# Patient Record
Sex: MALE | Race: OTHER | HISPANIC OR LATINO | Employment: FULL TIME | URBAN - METROPOLITAN AREA
[De-identification: names, ages, dates, MRNs, and addresses within clinical notes are randomized per-mention and may not be internally consistent; named-entity substitution may affect disease eponyms.]

---

## 2020-12-21 ENCOUNTER — NURSE TRIAGE (OUTPATIENT)
Dept: OTHER | Facility: OTHER | Age: 50
End: 2020-12-21

## 2020-12-22 ENCOUNTER — NURSE TRIAGE (OUTPATIENT)
Dept: OTHER | Facility: OTHER | Age: 50
End: 2020-12-22

## 2020-12-22 DIAGNOSIS — Z20.828 SARS-ASSOCIATED CORONAVIRUS EXPOSURE: Primary | ICD-10-CM

## 2020-12-26 DIAGNOSIS — Z20.828 SARS-ASSOCIATED CORONAVIRUS EXPOSURE: ICD-10-CM

## 2020-12-26 PROCEDURE — U0003 INFECTIOUS AGENT DETECTION BY NUCLEIC ACID (DNA OR RNA); SEVERE ACUTE RESPIRATORY SYNDROME CORONAVIRUS 2 (SARS-COV-2) (CORONAVIRUS DISEASE [COVID-19]), AMPLIFIED PROBE TECHNIQUE, MAKING USE OF HIGH THROUGHPUT TECHNOLOGIES AS DESCRIBED BY CMS-2020-01-R: HCPCS | Performed by: FAMILY MEDICINE

## 2020-12-28 LAB — SARS-COV-2 RNA SPEC QL NAA+PROBE: DETECTED

## 2020-12-30 ENCOUNTER — TELEPHONE (OUTPATIENT)
Dept: CARDIOLOGY CLINIC | Facility: CLINIC | Age: 50
End: 2020-12-30

## 2020-12-31 ENCOUNTER — TELEPHONE (OUTPATIENT)
Dept: OTHER | Facility: OTHER | Age: 50
End: 2020-12-31

## 2020-12-31 NOTE — TELEPHONE ENCOUNTER
The patient was called for notification of a test result for COVID-19  The patient did not answer the phone and a voicemail was left requesting a call back to [phone], Option 7

## 2021-01-03 ENCOUNTER — TELEPHONE (OUTPATIENT)
Dept: OTHER | Facility: OTHER | Age: 51
End: 2021-01-03

## 2021-01-03 NOTE — TELEPHONE ENCOUNTER
The patient was called for notification of a test result for COVID-19  The patient answered and was provided with the following information:    Your test for the novel coronavirus, also known as COVID-19, was positive  The sample showed that the virus was present  We are going to go through some general information to keep you safe at home and schedule a virtual visit for you to be evaluated by your provider  If your symptoms are generally mild and stable, you are safe to isolate yourself at home  If you develop difficulty breathing before your scheduled visit with your provider, you should contact the office immediately or go to the nearest ED for evaluation  Treatment of coronavirus does not require an antibiotic  The most important thing you can do is to remain home except to receive medical care  Do not go to work, school, or public areas  Wash your hands often with soap and water for at least 20 seconds  Alternatively, you may use hand  with at least 60% alcohol content  Cover your coughs and sneezes and use a facemask when around others if possible  Clean all high-touch surfaces every day such as doorknobs and cellphones  Positive COVID-19 test results are reportable to the [place] Department of Health  You may receive a call from trained public health staff to conduct an interview  It is important to answer their call  They will ask you to verify who you are  During the call they will ask you about what symptoms you have, what you did before you got sick, and who you were close to while sick  The health department does this to make sure everyone stays healthy and to reduce the spread of the virus  If you would like to verify if the caller does in fact work in contact tracing, call the [address] [place] [address] at [employer] ([phone])   For additional information, please visit the [facility] website: www health [place] gov     If you have any additional questions, we can schedule a virtual visit for you with a provider or call the [facility] [phone], option [phone], for care advice    For additional information, please visit the Coronavirus FAQ on the [facility] home page ([facility]  org)

## 2022-03-11 ENCOUNTER — HOSPITAL ENCOUNTER (EMERGENCY)
Facility: HOSPITAL | Age: 52
Discharge: HOME | End: 2022-03-11
Payer: COMMERCIAL

## 2022-03-11 ENCOUNTER — APPOINTMENT (EMERGENCY)
Dept: CT IMAGING | Facility: HOSPITAL | Age: 52
End: 2022-03-11
Payer: COMMERCIAL

## 2022-03-11 VITALS
RESPIRATION RATE: 18 BRPM | SYSTOLIC BLOOD PRESSURE: 127 MMHG | WEIGHT: 195.33 LBS | TEMPERATURE: 97.3 F | HEART RATE: 97 BPM | BODY MASS INDEX: 25.89 KG/M2 | OXYGEN SATURATION: 97 % | DIASTOLIC BLOOD PRESSURE: 78 MMHG | HEIGHT: 73 IN

## 2022-03-11 DIAGNOSIS — N39.0 UTI (URINARY TRACT INFECTION): Primary | ICD-10-CM

## 2022-03-11 LAB
ALBUMIN SERPL BCP-MCNC: 3.2 G/DL (ref 3.5–5)
ALP SERPL-CCNC: 61 U/L (ref 46–116)
ALT SERPL W P-5'-P-CCNC: 17 U/L (ref 12–78)
ANION GAP SERPL CALCULATED.3IONS-SCNC: 10 MMOL/L (ref 4–13)
AST SERPL W P-5'-P-CCNC: 14 U/L (ref 5–45)
BACTERIA UR QL AUTO: ABNORMAL /HPF
BASOPHILS # BLD AUTO: 0.04 THOUSANDS/ΜL (ref 0–0.1)
BASOPHILS NFR BLD AUTO: 0 % (ref 0–1)
BILIRUB SERPL-MCNC: 1.06 MG/DL (ref 0.2–1)
BILIRUB UR QL STRIP: ABNORMAL
BUN SERPL-MCNC: 15 MG/DL (ref 5–25)
CALCIUM ALBUM COR SERPL-MCNC: 8.8 MG/DL (ref 8.3–10.1)
CALCIUM SERPL-MCNC: 8.2 MG/DL (ref 8.3–10.1)
CHLORIDE SERPL-SCNC: 103 MMOL/L (ref 100–108)
CLARITY UR: ABNORMAL
CO2 SERPL-SCNC: 23 MMOL/L (ref 21–32)
COLOR UR: YELLOW
CREAT SERPL-MCNC: 1.14 MG/DL (ref 0.6–1.3)
EOSINOPHIL # BLD AUTO: 0.1 THOUSAND/ΜL (ref 0–0.61)
EOSINOPHIL NFR BLD AUTO: 1 % (ref 0–6)
ERYTHROCYTE [DISTWIDTH] IN BLOOD BY AUTOMATED COUNT: 12.7 % (ref 11.6–15.1)
GFR SERPL CREATININE-BSD FRML MDRD: 73 ML/MIN/1.73SQ M
GLUCOSE SERPL-MCNC: 155 MG/DL (ref 65–140)
GLUCOSE UR STRIP-MCNC: NEGATIVE MG/DL
HCT VFR BLD AUTO: 39.1 % (ref 36.5–49.3)
HGB BLD-MCNC: 13.2 G/DL (ref 12–17)
HGB UR QL STRIP.AUTO: ABNORMAL
IMM GRANULOCYTES # BLD AUTO: 0.23 THOUSAND/UL (ref 0–0.2)
IMM GRANULOCYTES NFR BLD AUTO: 1 % (ref 0–2)
KETONES UR STRIP-MCNC: ABNORMAL MG/DL
LEUKOCYTE ESTERASE UR QL STRIP: ABNORMAL
LYMPHOCYTES # BLD AUTO: 2.21 THOUSANDS/ΜL (ref 0.6–4.47)
LYMPHOCYTES NFR BLD AUTO: 12 % (ref 14–44)
MCH RBC QN AUTO: 29.1 PG (ref 26.8–34.3)
MCHC RBC AUTO-ENTMCNC: 33.8 G/DL (ref 31.4–37.4)
MCV RBC AUTO: 86 FL (ref 82–98)
MONOCYTES # BLD AUTO: 1.49 THOUSAND/ΜL (ref 0.17–1.22)
MONOCYTES NFR BLD AUTO: 8 % (ref 4–12)
NEUTROPHILS # BLD AUTO: 14.93 THOUSANDS/ΜL (ref 1.85–7.62)
NEUTS SEG NFR BLD AUTO: 78 % (ref 43–75)
NITRITE UR QL STRIP: POSITIVE
NON-SQ EPI CELLS URNS QL MICRO: ABNORMAL /HPF
NRBC BLD AUTO-RTO: 0 /100 WBCS
PH UR STRIP.AUTO: 6 [PH]
PLATELET # BLD AUTO: 159 THOUSANDS/UL (ref 149–390)
PMV BLD AUTO: 8.8 FL (ref 8.9–12.7)
POTASSIUM SERPL-SCNC: 3.2 MMOL/L (ref 3.5–5.3)
PROT SERPL-MCNC: 7.6 G/DL (ref 6.4–8.2)
PROT UR STRIP-MCNC: ABNORMAL MG/DL
RBC # BLD AUTO: 4.53 MILLION/UL (ref 3.88–5.62)
RBC #/AREA URNS AUTO: ABNORMAL /HPF
SODIUM SERPL-SCNC: 136 MMOL/L (ref 136–145)
SP GR UR STRIP.AUTO: >=1.03 (ref 1–1.03)
UROBILINOGEN UR QL STRIP.AUTO: 2 E.U./DL
WBC # BLD AUTO: 19 THOUSAND/UL (ref 4.31–10.16)
WBC #/AREA URNS AUTO: ABNORMAL /HPF

## 2022-03-11 PROCEDURE — 80053 COMPREHEN METABOLIC PANEL: CPT | Performed by: PHYSICIAN ASSISTANT

## 2022-03-11 PROCEDURE — 96365 THER/PROPH/DIAG IV INF INIT: CPT

## 2022-03-11 PROCEDURE — 87086 URINE CULTURE/COLONY COUNT: CPT | Performed by: PHYSICIAN ASSISTANT

## 2022-03-11 PROCEDURE — 96361 HYDRATE IV INFUSION ADD-ON: CPT

## 2022-03-11 PROCEDURE — 87077 CULTURE AEROBIC IDENTIFY: CPT | Performed by: PHYSICIAN ASSISTANT

## 2022-03-11 PROCEDURE — 85025 COMPLETE CBC W/AUTO DIFF WBC: CPT | Performed by: PHYSICIAN ASSISTANT

## 2022-03-11 PROCEDURE — 99284 EMERGENCY DEPT VISIT MOD MDM: CPT

## 2022-03-11 PROCEDURE — 81001 URINALYSIS AUTO W/SCOPE: CPT | Performed by: PHYSICIAN ASSISTANT

## 2022-03-11 PROCEDURE — 74177 CT ABD & PELVIS W/CONTRAST: CPT

## 2022-03-11 PROCEDURE — 87186 SC STD MICRODIL/AGAR DIL: CPT | Performed by: PHYSICIAN ASSISTANT

## 2022-03-11 PROCEDURE — 99284 EMERGENCY DEPT VISIT MOD MDM: CPT | Performed by: PHYSICIAN ASSISTANT

## 2022-03-11 PROCEDURE — 36415 COLL VENOUS BLD VENIPUNCTURE: CPT | Performed by: PHYSICIAN ASSISTANT

## 2022-03-11 PROCEDURE — G1004 CDSM NDSC: HCPCS

## 2022-03-11 RX ORDER — DOXYCYCLINE 100 MG/1
100 TABLET ORAL 2 TIMES DAILY
Qty: 14 TABLET | Refills: 0 | Status: SHIPPED | OUTPATIENT
Start: 2022-03-11 | End: 2022-03-18

## 2022-03-11 RX ORDER — CEFTRIAXONE 1 G/50ML
1000 INJECTION, SOLUTION INTRAVENOUS ONCE
Status: COMPLETED | OUTPATIENT
Start: 2022-03-11 | End: 2022-03-11

## 2022-03-11 RX ORDER — CEPHALEXIN 500 MG/1
500 CAPSULE ORAL EVERY 6 HOURS SCHEDULED
Qty: 28 CAPSULE | Refills: 0 | Status: SHIPPED | OUTPATIENT
Start: 2022-03-11 | End: 2022-03-18

## 2022-03-11 RX ADMIN — IOHEXOL 100 ML: 350 INJECTION, SOLUTION INTRAVENOUS at 19:21

## 2022-03-11 RX ADMIN — CEFTRIAXONE 1000 MG: 1 INJECTION, SOLUTION INTRAVENOUS at 19:49

## 2022-03-11 RX ADMIN — SODIUM CHLORIDE 1000 ML: 0.9 INJECTION, SOLUTION INTRAVENOUS at 18:43

## 2022-03-11 NOTE — ED PROVIDER NOTES
History  Chief Complaint   Patient presents with    Blood in Urine     pt c/o fevers, chills, difficulty urinating and burning on urination starting on thursday  pt also states he has blood in urine   Fever - 9 weeks to 74 years     Patient presents to the emergency department today for evaluation some blood in the urine with fevers burning with urination mild low back pain  Symptoms present for about 3-4 days  Denies any penile discharge  No chest pain shortness of breath  No vomiting  No significant abdominal pain  No diarrhea or constipation  Nontoxic-appearing at bedside          None       Past Medical History:   Diagnosis Date    Asthma     Disease of thyroid gland        History reviewed  No pertinent surgical history  History reviewed  No pertinent family history  I have reviewed and agree with the history as documented  E-Cigarette/Vaping    E-Cigarette Use Never User      E-Cigarette/Vaping Substances     Social History     Tobacco Use    Smoking status: Never Smoker    Smokeless tobacco: Never Used   Vaping Use    Vaping Use: Never used   Substance Use Topics    Alcohol use: Not Currently    Drug use: Never       Review of Systems   Constitutional: Positive for fever  Negative for chills  HENT: Negative for ear pain and sore throat  Eyes: Negative for pain and visual disturbance  Respiratory: Negative for cough and shortness of breath  Cardiovascular: Negative for chest pain and palpitations  Gastrointestinal: Negative for abdominal pain and vomiting  Genitourinary: Positive for dysuria and hematuria  Musculoskeletal: Positive for back pain  Negative for arthralgias  Skin: Negative for color change and rash  Neurological: Negative for seizures and syncope  All other systems reviewed and are negative  Physical Exam  Physical Exam  Vitals reviewed  Constitutional:       General: He is not in acute distress       Appearance: He is well-developed and normal weight  He is not ill-appearing, toxic-appearing or diaphoretic  HENT:      Right Ear: External ear normal  No swelling  Tympanic membrane is not bulging  Left Ear: External ear normal  No swelling  Tympanic membrane is not bulging  Nose: Nose normal       Mouth/Throat:      Pharynx: No oropharyngeal exudate  Eyes:      General: Lids are normal       Conjunctiva/sclera: Conjunctivae normal       Pupils: Pupils are equal, round, and reactive to light  Neck:      Thyroid: No thyromegaly  Vascular: No JVD  Trachea: No tracheal deviation  Cardiovascular:      Rate and Rhythm: Normal rate and regular rhythm  Pulses: Normal pulses  Heart sounds: Normal heart sounds  No murmur heard  No friction rub  No gallop  Pulmonary:      Effort: Pulmonary effort is normal  No respiratory distress  Breath sounds: Normal breath sounds  No stridor  No wheezing or rales  Chest:      Chest wall: No tenderness  Abdominal:      General: Bowel sounds are normal  There is no distension  Palpations: Abdomen is soft  There is no mass  Tenderness: There is no abdominal tenderness  There is no guarding or rebound  Negative signs include Wilson's sign  Hernia: No hernia is present  Musculoskeletal:         General: No tenderness  Normal range of motion  Cervical back: Normal range of motion and neck supple  No edema  Normal range of motion  Lymphadenopathy:      Cervical: No cervical adenopathy  Skin:     General: Skin is warm and dry  Capillary Refill: Capillary refill takes less than 2 seconds  Coloration: Skin is not pale  Findings: No erythema or rash  Neurological:      General: No focal deficit present  Mental Status: He is alert and oriented to person, place, and time  GCS: GCS eye subscore is 4  GCS verbal subscore is 5  GCS motor subscore is 6  Cranial Nerves: No cranial nerve deficit  Sensory: No sensory deficit  Deep Tendon Reflexes: Reflexes are normal and symmetric  Psychiatric:         Mood and Affect: Mood normal          Speech: Speech normal          Behavior: Behavior normal          Thought Content: Thought content normal          Judgment: Judgment normal          Vital Signs  ED Triage Vitals   Temperature Pulse Respirations Blood Pressure SpO2   03/11/22 1810 03/11/22 1810 03/11/22 1810 03/11/22 1813 03/11/22 1810   (!) 97 3 °F (36 3 °C) 97 18 127/78 97 %      Temp Source Heart Rate Source Patient Position - Orthostatic VS BP Location FiO2 (%)   03/11/22 1810 03/11/22 1810 03/11/22 1810 03/11/22 1810 --   Temporal Monitor Lying Left arm       Pain Score       --                  Vitals:    03/11/22 1810 03/11/22 1813   BP:  127/78   Pulse: 97    Patient Position - Orthostatic VS: Lying          Visual Acuity      ED Medications  Medications   sodium chloride 0 9 % bolus 1,000 mL (1,000 mL Intravenous New Bag 3/11/22 1843)   cefTRIAXone (ROCEPHIN) IVPB (premix in dextrose) 1,000 mg 50 mL (1,000 mg Intravenous New Bag 3/11/22 1949)   iohexol (OMNIPAQUE) 350 MG/ML injection (SINGLE-DOSE) 100 mL (100 mL Intravenous Given 3/11/22 1921)       Diagnostic Studies  Results Reviewed     Procedure Component Value Units Date/Time    Urine Microscopic [[id number]]  (Abnormal) Collected: 03/11/22 1841    Lab Status: Final result Specimen: Urine, Clean Catch Updated: 03/11/22 1901     RBC, UA       Field obscured, unable to enumerate     /hpf     WBC, UA Innumerable /hpf      Epithelial Cells       Field obscured, unable to enumerate     /hpf     Bacteria, UA Innumerable /hpf     Urine culture [[id number]] Collected: 03/11/22 1841    Lab Status:  In process Specimen: Urine, Clean Catch Updated: 03/11/22 1901    Comprehensive metabolic panel [[id number]]  (Abnormal) Collected: 03/11/22 1842    Lab Status: Final result Specimen: Blood from Arm, Right Updated: 03/11/22 1901     Sodium 136 mmol/L      Potassium 3 2 mmol/L Chloride 103 mmol/L      CO2 23 mmol/L      ANION GAP 10 mmol/L      BUN 15 mg/dL      Creatinine 1 14 mg/dL      Glucose 155 mg/dL      Calcium 8 2 mg/dL      Corrected Calcium 8 8 mg/dL      AST 14 U/L      ALT 17 U/L      Alkaline Phosphatase 61 U/L      Total Protein 7 6 g/dL      Albumin 3 2 g/dL      Total Bilirubin 1 06 mg/dL      eGFR 73 ml/min/1 73sq m     Narrative:      National Kidney Disease Foundation guidelines for Chronic Kidney Disease (CKD):     Stage 1 with normal or high GFR (GFR > 90 mL/min/1 73 square meters)    Stage 2 Mild CKD (GFR = 60-89 mL/min/1 73 square meters)    Stage 3A Moderate CKD (GFR = 45-59 mL/min/1 73 square meters)    Stage 3B Moderate CKD (GFR = 30-44 mL/min/1 73 square meters)    Stage 4 Severe CKD (GFR = 15-29 mL/min/1 73 square meters)    Stage 5 End Stage CKD (GFR <15 mL/min/1 73 square meters)  Note: GFR calculation is accurate only with a steady state creatinine    UA w Reflex to Microscopic w Reflex to Culture [[id number]]  (Abnormal) Collected: 03/11/22 1841    Lab Status: Final result Specimen: Urine, Clean Catch Updated: 03/11/22 1849     Color, UA Yellow     Clarity, UA Cloudy     Specific Gravity, UA >=1 030     pH, UA 6 0     Leukocytes, UA Moderate     Nitrite, UA Positive     Protein,  (2+) mg/dl      Glucose, UA Negative mg/dl      Ketones, UA Trace mg/dl      Urobilinogen, UA 2 0 E U /dl      Bilirubin, UA Interference- unable to analyze     Blood, UA Large    CBC and differential [[id number]]  (Abnormal) Collected: 03/11/22 1842    Lab Status: Final result Specimen: Blood from Arm, Right Updated: 03/11/22 1847     WBC 19 00 Thousand/uL      RBC 4 53 Million/uL      Hemoglobin 13 2 g/dL      Hematocrit 39 1 %      MCV 86 fL      MCH 29 1 pg      MCHC 33 8 g/dL      RDW 12 7 %      MPV 8 8 fL      Platelets 157 Thousands/uL      nRBC 0 /100 WBCs      Neutrophils Relative 78 %      Immat GRANS % 1 %      Lymphocytes Relative 12 %      Monocytes Relative 8 %      Eosinophils Relative 1 %      Basophils Relative 0 %      Neutrophils Absolute 14 93 Thousands/µL      Immature Grans Absolute 0 23 Thousand/uL      Lymphocytes Absolute 2 21 Thousands/µL      Monocytes Absolute 1 49 Thousand/µL      Eosinophils Absolute 0 10 Thousand/µL      Basophils Absolute 0 04 Thousands/µL                  CT abdomen pelvis with contrast   Final Result by Jenise Mccabe MD (03/11 2019)         1  Findings suggestive of cystitis  Prominent prostate and seminal vesicles fluid also indicate prostatitis, in the appropriate clinical context  2   No evidence of pyelonephritis or obstructive uropathy  Workstation performed: [id number]                    Procedures  Procedures         ED Course  ED Course as of 03/11/22 2042   Fri Mar 11, 2022   1824 Blood Pressure: 127/78   1824 Temperature(!): 97 3 °F (36 3 °C)   1824 Pulse: 97   1824 Respirations: 18   1824 SpO2: 97 %   1906 WBC(!): 19 00   1906 Hemoglobin: 13 2   1906 MPV(!): 8 8   1906 Sodium: 136   1906 eGFR: 73   1906 Leukocytes, UA(!): Moderate   1907 Nitrite, UA(!): Positive   1907 Blood, UA(!): Large   2004 Awaiting CT interpretation   2021 IMPRESSION:        1  Findings suggestive of cystitis  Prominent prostate and seminal vesicles fluid also indicate prostatitis, in the appropriate clinical context  2   No evidence of pyelonephritis or obstructive uropathy  2038 Patient denies penile discharge or possibility of STD  SBIRT 22yo+      Most Recent Value   SBIRT (24 yo +)    In order to provide better care to our patients, we are screening all of our patients for alcohol and drug use  Would it be okay to ask you these screening questions? Yes Filed at: 03/11/2022 1823   Initial Alcohol Screen: US AUDIT-C     1  How often do you have a drink containing alcohol? 0 Filed at: 03/11/2022 1823   2   How many drinks containing alcohol do you have on a typical day you are drinking? 0 Filed at: 03/11/2022 1823   3a  Male UNDER 65: How often do you have five or more drinks on one occasion? 0 Filed at: 03/11/2022 1823   3b  FEMALE Any Age, or MALE 65+: How often do you have 4 or more drinks on one occassion? 0 Filed at: 03/11/2022 1823   Audit-C Score 0 Filed at: 03/11/2022 1823   CADENCE: How many times in the past year have you    Used an illegal drug or used a prescription medication for non-medical reasons? Never Filed at: 03/11/2022 1823                    MDM    Disposition  Final diagnoses:   UTI (urinary tract infection)     Time reflects when diagnosis was documented in both MDM as applicable and the Disposition within this note     Time User Action Codes Description Comment    3/11/2022  8:41 PM Stephanie MASTERSON Add [N39 0] UTI (urinary tract infection)       ED Disposition     ED Disposition Condition Date/Time Comment    Discharge Stable Fri Mar 11, 2022  8:41 PM [facility] discharge to home/self care  Follow-up Information    None         Patient's Medications   Discharge Prescriptions    CEPHALEXIN (KEFLEX) 500 MG CAPSULE    Take 1 capsule (500 mg total) by mouth every 6 (six) hours for 7 days       Start Date: 3/11/2022 End Date: 3/18/2022       Order Dose: 500 mg       Quantity: 28 capsule    Refills: 0    DOXYCYCLINE (ADOXA) 100 MG TABLET    Take 1 tablet (100 mg total) by mouth 2 (two) times a day for 7 days       Start Date: 3/11/2022 End Date: 3/18/2022       Order Dose: 100 mg       Quantity: 14 tablet    Refills: 0       No discharge procedures on file      PDMP Review     None          ED Provider  Electronically Signed by           Sienna Wallace PA-C  03/11/22 2042

## 2022-03-12 NOTE — ED NOTES
Patient given local list of PCP's to f/u with as he expressed he does not have one        Wayne Munoz RN  03/11/22 2100

## 2022-03-14 LAB — BACTERIA UR CULT: ABNORMAL

## 2022-04-04 ENCOUNTER — HOSPITAL ENCOUNTER (EMERGENCY)
Facility: HOSPITAL | Age: 52
Discharge: HOME | End: 2022-04-04
Attending: EMERGENCY MEDICINE | Admitting: EMERGENCY MEDICINE
Payer: COMMERCIAL

## 2022-04-04 ENCOUNTER — APPOINTMENT (EMERGENCY)
Dept: CT IMAGING | Facility: HOSPITAL | Age: 52
End: 2022-04-04
Payer: COMMERCIAL

## 2022-04-04 ENCOUNTER — TELEPHONE (OUTPATIENT)
Dept: UROLOGY | Facility: MEDICAL CENTER | Age: 52
End: 2022-04-04

## 2022-04-04 VITALS
HEART RATE: 73 BPM | BODY MASS INDEX: 25.73 KG/M2 | SYSTOLIC BLOOD PRESSURE: 119 MMHG | WEIGHT: 195 LBS | RESPIRATION RATE: 18 BRPM | OXYGEN SATURATION: 99 % | TEMPERATURE: 98.4 F | DIASTOLIC BLOOD PRESSURE: 63 MMHG

## 2022-04-04 DIAGNOSIS — N40.0 ENLARGED PROSTATE: ICD-10-CM

## 2022-04-04 DIAGNOSIS — N12 PYELONEPHRITIS: ICD-10-CM

## 2022-04-04 DIAGNOSIS — N39.0 UTI (URINARY TRACT INFECTION): Primary | ICD-10-CM

## 2022-04-04 DIAGNOSIS — D72.829 LEUKOCYTOSIS: ICD-10-CM

## 2022-04-04 LAB
ALBUMIN SERPL BCP-MCNC: 3.7 G/DL (ref 3.5–5)
ALP SERPL-CCNC: 68 U/L (ref 46–116)
ALT SERPL W P-5'-P-CCNC: 31 U/L (ref 12–78)
ANION GAP SERPL CALCULATED.3IONS-SCNC: 6 MMOL/L (ref 4–13)
AST SERPL W P-5'-P-CCNC: 22 U/L (ref 5–45)
BACTERIA UR QL AUTO: ABNORMAL /HPF
BASOPHILS # BLD AUTO: 0.04 THOUSANDS/ΜL (ref 0–0.1)
BASOPHILS NFR BLD AUTO: 0 % (ref 0–1)
BILIRUB SERPL-MCNC: 2.21 MG/DL (ref 0.2–1)
BILIRUB UR QL STRIP: NEGATIVE
BUN SERPL-MCNC: 11 MG/DL (ref 5–25)
CALCIUM SERPL-MCNC: 8.6 MG/DL (ref 8.3–10.1)
CHLORIDE SERPL-SCNC: 104 MMOL/L (ref 100–108)
CLARITY UR: ABNORMAL
CO2 SERPL-SCNC: 29 MMOL/L (ref 21–32)
COLOR UR: YELLOW
CREAT SERPL-MCNC: 1.11 MG/DL (ref 0.6–1.3)
EOSINOPHIL # BLD AUTO: 0.06 THOUSAND/ΜL (ref 0–0.61)
EOSINOPHIL NFR BLD AUTO: 1 % (ref 0–6)
ERYTHROCYTE [DISTWIDTH] IN BLOOD BY AUTOMATED COUNT: 13.2 % (ref 11.6–15.1)
GFR SERPL CREATININE-BSD FRML MDRD: 75 ML/MIN/1.73SQ M
GLUCOSE SERPL-MCNC: 107 MG/DL (ref 65–140)
GLUCOSE UR STRIP-MCNC: NEGATIVE MG/DL
HCT VFR BLD AUTO: 41.8 % (ref 36.5–49.3)
HGB BLD-MCNC: 13.5 G/DL (ref 12–17)
HGB UR QL STRIP.AUTO: ABNORMAL
IMM GRANULOCYTES # BLD AUTO: 0.03 THOUSAND/UL (ref 0–0.2)
IMM GRANULOCYTES NFR BLD AUTO: 0 % (ref 0–2)
KETONES UR STRIP-MCNC: NEGATIVE MG/DL
LACTATE SERPL-SCNC: 0.7 MMOL/L (ref 0.5–2)
LEUKOCYTE ESTERASE UR QL STRIP: ABNORMAL
LIPASE SERPL-CCNC: 123 U/L (ref 73–393)
LYMPHOCYTES # BLD AUTO: 2.59 THOUSANDS/ΜL (ref 0.6–4.47)
LYMPHOCYTES NFR BLD AUTO: 21 % (ref 14–44)
MCH RBC QN AUTO: 29.2 PG (ref 26.8–34.3)
MCHC RBC AUTO-ENTMCNC: 32.3 G/DL (ref 31.4–37.4)
MCV RBC AUTO: 91 FL (ref 82–98)
MONOCYTES # BLD AUTO: 1.76 THOUSAND/ΜL (ref 0.17–1.22)
MONOCYTES NFR BLD AUTO: 14 % (ref 4–12)
NEUTROPHILS # BLD AUTO: 7.8 THOUSANDS/ΜL (ref 1.85–7.62)
NEUTS SEG NFR BLD AUTO: 64 % (ref 43–75)
NITRITE UR QL STRIP: POSITIVE
NON-SQ EPI CELLS URNS QL MICRO: ABNORMAL /HPF
NRBC BLD AUTO-RTO: 0 /100 WBCS
PH UR STRIP.AUTO: 6 [PH]
PLATELET # BLD AUTO: 209 THOUSANDS/UL (ref 149–390)
PMV BLD AUTO: 8.5 FL (ref 8.9–12.7)
POTASSIUM SERPL-SCNC: 3.9 MMOL/L (ref 3.5–5.3)
PROT SERPL-MCNC: 7.7 G/DL (ref 6.4–8.2)
PROT UR STRIP-MCNC: ABNORMAL MG/DL
RBC # BLD AUTO: 4.62 MILLION/UL (ref 3.88–5.62)
RBC #/AREA URNS AUTO: ABNORMAL /HPF
SODIUM SERPL-SCNC: 139 MMOL/L (ref 136–145)
SP GR UR STRIP.AUTO: 1.02 (ref 1–1.03)
UROBILINOGEN UR QL STRIP.AUTO: 0.2 E.U./DL
WBC # BLD AUTO: 12.28 THOUSAND/UL (ref 4.31–10.16)
WBC #/AREA URNS AUTO: ABNORMAL /HPF

## 2022-04-04 PROCEDURE — 99284 EMERGENCY DEPT VISIT MOD MDM: CPT | Performed by: PHYSICIAN ASSISTANT

## 2022-04-04 PROCEDURE — 74177 CT ABD & PELVIS W/CONTRAST: CPT

## 2022-04-04 PROCEDURE — 36415 COLL VENOUS BLD VENIPUNCTURE: CPT | Performed by: PHYSICIAN ASSISTANT

## 2022-04-04 PROCEDURE — 96375 TX/PRO/DX INJ NEW DRUG ADDON: CPT

## 2022-04-04 PROCEDURE — 80053 COMPREHEN METABOLIC PANEL: CPT | Performed by: PHYSICIAN ASSISTANT

## 2022-04-04 PROCEDURE — 81001 URINALYSIS AUTO W/SCOPE: CPT | Performed by: PHYSICIAN ASSISTANT

## 2022-04-04 PROCEDURE — 87491 CHLMYD TRACH DNA AMP PROBE: CPT | Performed by: PHYSICIAN ASSISTANT

## 2022-04-04 PROCEDURE — 87086 URINE CULTURE/COLONY COUNT: CPT | Performed by: PHYSICIAN ASSISTANT

## 2022-04-04 PROCEDURE — 85025 COMPLETE CBC W/AUTO DIFF WBC: CPT | Performed by: PHYSICIAN ASSISTANT

## 2022-04-04 PROCEDURE — 87186 SC STD MICRODIL/AGAR DIL: CPT | Performed by: PHYSICIAN ASSISTANT

## 2022-04-04 PROCEDURE — 83605 ASSAY OF LACTIC ACID: CPT | Performed by: PHYSICIAN ASSISTANT

## 2022-04-04 PROCEDURE — 99284 EMERGENCY DEPT VISIT MOD MDM: CPT

## 2022-04-04 PROCEDURE — 96361 HYDRATE IV INFUSION ADD-ON: CPT

## 2022-04-04 PROCEDURE — 87591 N.GONORRHOEAE DNA AMP PROB: CPT | Performed by: PHYSICIAN ASSISTANT

## 2022-04-04 PROCEDURE — 87077 CULTURE AEROBIC IDENTIFY: CPT | Performed by: PHYSICIAN ASSISTANT

## 2022-04-04 PROCEDURE — G1004 CDSM NDSC: HCPCS

## 2022-04-04 PROCEDURE — 83690 ASSAY OF LIPASE: CPT | Performed by: PHYSICIAN ASSISTANT

## 2022-04-04 PROCEDURE — 96365 THER/PROPH/DIAG IV INF INIT: CPT

## 2022-04-04 RX ORDER — LEVOFLOXACIN 5 MG/ML
750 INJECTION, SOLUTION INTRAVENOUS ONCE
Status: COMPLETED | OUTPATIENT
Start: 2022-04-04 | End: 2022-04-04

## 2022-04-04 RX ORDER — LEVOFLOXACIN 500 MG/1
500 TABLET, FILM COATED ORAL DAILY
Qty: 7 TABLET | Refills: 0 | Status: SHIPPED | OUTPATIENT
Start: 2022-04-04 | End: 2022-04-11

## 2022-04-04 RX ORDER — KETOROLAC TROMETHAMINE 30 MG/ML
15 INJECTION, SOLUTION INTRAMUSCULAR; INTRAVENOUS ONCE
Status: COMPLETED | OUTPATIENT
Start: 2022-04-04 | End: 2022-04-04

## 2022-04-04 RX ADMIN — KETOROLAC TROMETHAMINE 15 MG: 30 INJECTION, SOLUTION INTRAMUSCULAR at 09:31

## 2022-04-04 RX ADMIN — IOHEXOL 100 ML: 350 INJECTION, SOLUTION INTRAVENOUS at 10:08

## 2022-04-04 RX ADMIN — SODIUM CHLORIDE 1000 ML: 0.9 INJECTION, SOLUTION INTRAVENOUS at 09:30

## 2022-04-04 RX ADMIN — LEVOFLOXACIN 750 MG: 5 INJECTION, SOLUTION INTRAVENOUS at 11:31

## 2022-04-04 NOTE — TELEPHONE ENCOUNTER
Please Triage -   New Patient- [facility]      What is the reason for the patients appointment? Patient called stating he was in the ER with flank pain, having pain when urinating and he stated he had ct scan and it showed     Subtle area of decreased enhancement in the lower pole the right kidney suspicious for pyelonephritis  No hydronephrosis or obstructive uropathy  Tiny left renal cyst      Normal appendix      Enlarged prostate  Imaging/Lab Results:      Do we accept the patient's insurance or is the patient Self-Pay? Provider & Plan:[employer]    Member ID#:       Has the patient had any previous urologist(s)?no        Have patient records been requested?in epic        Has the patient had any outside testing done?in epic       Does the patient have a personal history of cancer?no    Patient can be reached at :

## 2022-04-04 NOTE — ED PROVIDER NOTES
History  Chief Complaint   Patient presents with    Flank Pain     pt c/o rt flank pain and painful urination starting lastnight  pt states he started this morning with blood in urine  Patient presents to the emergency department today for evaluation of right-sided flank pain right-sided abdominal pain as well as urinary burning and history of chills  This is a very pleasant 51-year-old male who presents alone today  No acute distress  He was actually seen here about 3-4 weeks ago, and a CT scan which noted cystitis and a urine that was suggestive of infection  He was placed on doxycycline and Keflex  He states he did initially get better after the treatment  He was essentially symptom free until yesterday morning when he noticed the early urinary symptoms  This morning is when he noted the right-sided flank pain  He also noted blood in the urine this morning  Chills were noted yesterday but no fevers today  Only abdominal surgical history is positive for left-sided inguinal hernia repair  None       Past Medical History:   Diagnosis Date    Asthma     Disease of thyroid gland        History reviewed  No pertinent surgical history  History reviewed  No pertinent family history  I have reviewed and agree with the history as documented  E-Cigarette/Vaping    E-Cigarette Use Never User      E-Cigarette/Vaping Substances     Social History     Tobacco Use    Smoking status: Never Smoker    Smokeless tobacco: Never Used   Vaping Use    Vaping Use: Never used   Substance Use Topics    Alcohol use: Not Currently    Drug use: Never       Review of Systems   Constitutional: Positive for chills  HENT: Negative  Eyes: Negative  Respiratory: Negative  Cardiovascular: Negative  Gastrointestinal: Positive for abdominal pain  Endocrine: Negative  Genitourinary: Positive for dysuria, flank pain and hematuria  Skin: Negative  Allergic/Immunologic: Negative  Neurological: Negative  Hematological: Negative  Psychiatric/Behavioral: Negative  All other systems reviewed and are negative  Physical Exam  Physical Exam  Vitals reviewed  Constitutional:       General: He is not in acute distress  Appearance: Normal appearance  He is well-developed and normal weight  He is not ill-appearing, toxic-appearing or diaphoretic  HENT:      Head: Normocephalic and atraumatic  Right Ear: External ear normal  No swelling  Tympanic membrane is not bulging  Left Ear: External ear normal  No swelling  Tympanic membrane is not bulging  Nose: Nose normal       Mouth/Throat:      Pharynx: No oropharyngeal exudate  Eyes:      General: Lids are normal       Conjunctiva/sclera: Conjunctivae normal       Pupils: Pupils are equal, round, and reactive to light  Neck:      Thyroid: No thyromegaly  Vascular: No JVD  Trachea: No tracheal deviation  Cardiovascular:      Rate and Rhythm: Normal rate and regular rhythm  Pulses: Normal pulses  Heart sounds: Normal heart sounds  No murmur heard  No friction rub  No gallop  Pulmonary:      Effort: Pulmonary effort is normal  No respiratory distress  Breath sounds: Normal breath sounds  No stridor  No wheezing or rales  Chest:      Chest wall: No tenderness  Abdominal:      General: Bowel sounds are normal  There is no distension  Palpations: Abdomen is soft  There is no mass  Tenderness: There is abdominal tenderness  There is right CVA tenderness  There is no guarding or rebound  Negative signs include Wilson's sign  Hernia: No hernia is present  Comments: Suprapubic and right upper quadrant abdominal tenderness   Musculoskeletal:         General: No tenderness  Normal range of motion  Cervical back: Normal range of motion and neck supple  No edema  Normal range of motion  Lymphadenopathy:      Cervical: No cervical adenopathy     Skin:     General: Skin is warm and dry  Capillary Refill: Capillary refill takes less than 2 seconds  Coloration: Skin is not pale  Findings: No erythema or rash  Neurological:      General: No focal deficit present  Mental Status: He is alert and oriented to person, place, and time  GCS: GCS eye subscore is 4  GCS verbal subscore is 5  GCS motor subscore is 6  Cranial Nerves: No cranial nerve deficit  Sensory: No sensory deficit  Deep Tendon Reflexes: Reflexes are normal and symmetric  Psychiatric:         Mood and Affect: Mood normal          Speech: Speech normal          Behavior: Behavior normal          Thought Content: Thought content normal          Judgment: Judgment normal          Vital Signs  ED Triage Vitals   Temperature Pulse Respirations Blood Pressure SpO2   04/04/22 0851 04/04/22 0851 04/04/22 0851 04/04/22 0851 04/04/22 0851   98 4 °F (36 9 °C) 85 18 136/82 98 %      Temp Source Heart Rate Source Patient Position - Orthostatic VS BP Location FiO2 (%)   04/04/22 0851 04/04/22 0851 04/04/22 0851 04/04/22 0851 --   Temporal Monitor Lying Right arm       Pain Score       04/04/22 0850       9           Vitals:    04/04/22 0930 04/04/22 1015 04/04/22 1100 04/04/22 1130   BP: 119/74 119/70 116/70 113/76   Pulse: 65 68 61 57   Patient Position - Orthostatic VS:             Visual Acuity      ED Medications  Medications   levofloxacin (LEVAQUIN) IVPB (premix in dextrose) 750 mg 150 mL (750 mg Intravenous New Bag 4/4/22 1131)   sodium chloride 0 9 % bolus 1,000 mL (0 mL Intravenous Stopped 4/4/22 1119)   ketorolac (TORADOL) injection 15 mg (15 mg Intravenous Given 4/4/22 0931)   iohexol (OMNIPAQUE) 350 MG/ML injection (SINGLE-DOSE) 100 mL (100 mL Intravenous Given 4/4/22 1008)       Diagnostic Studies  Results Reviewed     Procedure Component Value Units Date/Time    Chlamydia/GC amplified DNA by PCR [[id number]] Collected: 04/04/22 0926    Lab Status:  In process Specimen: Urine, Other Updated: 04/04/22 1018    Lactic acid [[id number]]  (Normal) Collected: 04/04/22 0927    Lab Status: Final result Specimen: Blood from Arm, Left Updated: 04/04/22 0950     LACTIC ACID 0 7 mmol/L     Narrative:      Result may be elevated if tourniquet was used during collection      Comprehensive metabolic panel [[id number]]  (Abnormal) Collected: 04/04/22 0927    Lab Status: Final result Specimen: Blood from Arm, Left Updated: 04/04/22 0947     Sodium 139 mmol/L      Potassium 3 9 mmol/L      Chloride 104 mmol/L      CO2 29 mmol/L      ANION GAP 6 mmol/L      BUN 11 mg/dL      Creatinine 1 11 mg/dL      Glucose 107 mg/dL      Calcium 8 6 mg/dL      AST 22 U/L      ALT 31 U/L      Alkaline Phosphatase 68 U/L      Total Protein 7 7 g/dL      Albumin 3 7 g/dL      Total Bilirubin 2 21 mg/dL      eGFR 75 ml/min/1 73sq m     Narrative:      [place] guidelines for Chronic Kidney Disease (CKD):     Stage 1 with normal or high GFR (GFR > 90 mL/min/1 73 square meters)    Stage 2 Mild CKD (GFR = 60-89 mL/min/1 73 square meters)    Stage 3A Moderate CKD (GFR = 45-59 mL/min/1 73 square meters)    Stage 3B Moderate CKD (GFR = 30-44 mL/min/1 73 square meters)    Stage 4 Severe CKD (GFR = 15-29 mL/min/1 73 square meters)    Stage 5 End Stage CKD (GFR <15 mL/min/1 73 square meters)  Note: GFR calculation is accurate only with a steady state creatinine    Lipase [[id number]]  (Normal) Collected: 04/04/22 0927    Lab Status: Final result Specimen: Blood from Arm, Left Updated: 04/04/22 0947     Lipase 123 u/L     Urine Microscopic [[id number]]  (Abnormal) Collected: 04/04/22 0926    Lab Status: Final result Specimen: Urine, Clean Catch Updated: 04/04/22 0942     RBC, UA       Field obscured, unable to enumerate     /hpf     WBC, UA Innumerable /hpf      Epithelial Cells       Field obscured, unable to enumerate     /hpf     Bacteria, UA       Field obscured, unable to enumerate     /hpf    Urine culture [[id number]] Collected: 04/04/22 0926    Lab Status: In process Specimen: Urine, Clean Catch Updated: 04/04/22 0942    UA w Reflex to Microscopic w Reflex to Culture [[id number]]  (Abnormal) Collected: 04/04/22 0926    Lab Status: Final result Specimen: Urine, Clean Catch Updated: 04/04/22 0934     Color, UA Yellow     Clarity, UA Cloudy     Specific Gravity, UA 1 025     pH, UA 6 0     Leukocytes, UA Moderate     Nitrite, UA Positive     Protein,  (2+) mg/dl      Glucose, UA Negative mg/dl      Ketones, UA Negative mg/dl      Urobilinogen, UA 0 2 E U /dl      Bilirubin, UA Negative     Blood, UA Large    CBC and differential [[id number]]  (Abnormal) Collected: 04/04/22 0927    Lab Status: Final result Specimen: Blood from Arm, Left Updated: 04/04/22 0933     WBC 12 28 Thousand/uL      RBC 4 62 Million/uL      Hemoglobin 13 5 g/dL      Hematocrit 41 8 %      MCV 91 fL      MCH 29 2 pg      MCHC 32 3 g/dL      RDW 13 2 %      MPV 8 5 fL      Platelets 928 Thousands/uL      nRBC 0 /100 WBCs      Neutrophils Relative 64 %      Immat GRANS % 0 %      Lymphocytes Relative 21 %      Monocytes Relative 14 %      Eosinophils Relative 1 %      Basophils Relative 0 %      Neutrophils Absolute 7 80 Thousands/µL      Immature Grans Absolute 0 03 Thousand/uL      Lymphocytes Absolute 2 59 Thousands/µL      Monocytes Absolute 1 76 Thousand/µL      Eosinophils Absolute 0 06 Thousand/µL      Basophils Absolute 0 04 Thousands/µL                  CT abdomen pelvis with contrast   Final Result by Jodi Brownlee MD (04/04 1026)      Subtle area of decreased enhancement in the lower pole the right kidney suspicious for pyelonephritis  No hydronephrosis or obstructive uropathy  Tiny left renal cyst       Normal appendix  Enlarged prostate                    Workstation performed: [id number]                    Procedures  Procedures         ED Course  ED Course as of 04/04/22 1234   Mon Apr 04, 2022   7568 Blood Pressure: 136/82 0908 Temperature: 98 4 °F (36 9 °C)   0908 Pulse: 85   0908 Respirations: 18   0908 SpO2: 98 %   0938 Leukocytes, UA(!): Moderate   0938 Nitrite, UA(!): Positive   0938 Clarity, UA: Cloudy   0938 Blood, UA(!): Large   0938 WBC(!): 12 28   0938 Hemoglobin: 13 5   0938 Platelet Count: 365   0945 Bacteria, UA(!): Field obscured, unable to enumerate   0945 WBC, UA(!): Innumerable   0945 RBC, UA(!): Field obscured, unable to enumerate   0949 Lipase: 123   1011 LACTIC ACID: 0 7   1016 Awaiting CT interpretation   1056 IMPRESSION:     Subtle area of decreased enhancement in the lower pole the right kidney suspicious for pyelonephritis  No hydronephrosis or obstructive uropathy  Tiny left renal cyst      Normal appendix      Enlarged prostate  1057 Of note patient's urine culture from the 11th of March grew greater than 100,000 colony count of E coli which was pan susceptible   1117 Will administer IV dose of Levaquin then speak with patient regarding disposition   1228 I had an extensive conversation with patient regarding his workup  He has mild leukocytosis with early pyelonephritis and enlarged prostate  Symptoms may be originating from the prostate causing some reflux I do recommend that he follows up with Urology he agrees  I will provide him with information on their practice location  1232 His last urine showed E coli colonization which was severion susceptible  Will discharged on Levaquin                                               MDM    Disposition  Final diagnoses:   UTI (urinary tract infection)   Leukocytosis   Pyelonephritis   Enlarged prostate     Time reflects when diagnosis was documented in both MDM as applicable and the Disposition within this note     Time User Action Codes Description Comment    4/4/2022  9:38 AM Beth MASTERSON Add [N39 0] UTI (urinary tract infection)     4/4/2022  9:38 AM Beth MASTERSON Add [D72 829] Leukocytosis     4/4/2022 10:56 AM Wil Fung Add [N12] Pyelonephritis     4/4/2022 12:33 PM Elizabet MASTERSON Add [N40 0] Enlarged prostate       ED Disposition     ED Disposition Condition Date/Time Comment    Discharge Stable Mon Apr 4, 2022 12:32 PM [facility] discharge to home/self care  Follow-up Information     Follow up With Specialties Details Why Contact Info Additional [address] [facility] Schedule an appointment as soon as possible for a visit   [address] [place] [phone]  [address] [place] [address] [facility], [address] [place] [address], [facility], [address] [place] [address], [address] [place] [address]          Patient's Medications   Discharge Prescriptions    LEVOFLOXACIN (LEVAQUIN) 500 MG TABLET    Take 1 tablet (500 mg total) by mouth daily for 7 days       Start Date: 4/4/2022  End Date: 4/11/2022       Order Dose: 500 mg       Quantity: 7 tablet    Refills: 0       No discharge procedures on file      PDMP Review     None          ED Provider  Electronically Signed by           Jaylyn De La O PA-C  04/04/22 5941

## 2022-04-04 NOTE — Clinical Note
Carine Mccain was seen and treated in our emergency department on 4/4/2022  No restrictions            Diagnosis: Pyelonephritis    Venus    He may return on this date: 04/05/2022    Patient is excused from work on the 4th of April     If you have any questions or concerns, please don't hesitate to call        Jeanie Day PA-C    ______________________________           _______________          _______________  Hospital Representative                              Date                                Time

## 2022-04-04 NOTE — ED NOTES
Pt has had right flank pain starting last night and this morning blood in urine  Urine collected and slightly red in color   Medicated labs sent fluids running call bell given      Agustín Gonzalez RN  04/04/22 2311

## 2022-04-05 LAB
C TRACH DNA SPEC QL NAA+PROBE: NEGATIVE
N GONORRHOEA DNA SPEC QL NAA+PROBE: NEGATIVE

## 2022-04-05 NOTE — TELEPHONE ENCOUNTER
Called and left voicemail message for patient to return call to the office to be scheduled for next available new patient appointment with AP in the [facility]

## 2022-04-06 LAB — BACTERIA UR CULT: ABNORMAL

## 2022-04-06 NOTE — TELEPHONE ENCOUNTER
Called and spoke with patient  He requested a Friday new patient appointment  Patient was scheduled with Esperanza Djeesus in the [place] office on 5/6/22 @ 2:00 pm  He is aware of office location and to arrive 15 minutes early

## 2022-05-06 ENCOUNTER — OFFICE VISIT (OUTPATIENT)
Dept: UROLOGY | Facility: CLINIC | Age: 52
End: 2022-05-06
Payer: COMMERCIAL

## 2022-05-06 VITALS
BODY MASS INDEX: 26.69 KG/M2 | DIASTOLIC BLOOD PRESSURE: 74 MMHG | SYSTOLIC BLOOD PRESSURE: 130 MMHG | HEIGHT: 73 IN | HEART RATE: 80 BPM | WEIGHT: 201.4 LBS

## 2022-05-06 DIAGNOSIS — N39.0 RECURRENT UTI: Primary | ICD-10-CM

## 2022-05-06 DIAGNOSIS — Z12.5 PROSTATE CANCER SCREENING: ICD-10-CM

## 2022-05-06 DIAGNOSIS — R10.9 FLANK PAIN: ICD-10-CM

## 2022-05-06 PROCEDURE — 99204 OFFICE O/P NEW MOD 45 MIN: CPT

## 2022-05-06 NOTE — PROGRESS NOTES
5/6/2022    Chief Complaint   Patient presents with    NP-abnormal CT scan       Assessment and Plan    46 y o  male new patient to office    1  Recurrent UTI  · Urine culture 4/04/2022  · Positive for 70,000-79,000 cfu/ml E-coli  · Received 1 dose of IV Levaquin 750 mg, discharge home with Levaquin 500 mg daily for 7 days  · Patient denies frequency, urgency, dysuria, or flank pain during this visit  · Unable to provide urine sample  · Currently taking oral cranberry pills  · Continue with drinking at least 64 oz of water per day as well as ensure you are urinating several times a day and do not postpone voiding  · Follow-up on as needed basis, call the office to experience any UTI symptoms  2  Right flank pain  · CT abdomen pelvis with contrast from 04/04/2022  · Subtle area of decreased enhancement in the lower pole the right kidney suspicious for pyelonephritis  No hydronephrosis or obstructive uropathy  Tiny left renal cyst   · Received 1 dose of IV Levaquin 750 mg, discharge home with Levaquin 500 mg daily for 7 days  · Currently resolved  3  Prostate cancer screening  · Denies family history of prostate cancer  · FAY in office reveals smooth symmetric prostate, no palpable nodules or masses  Estimated gland size between 35-40 g  · Baseline PSA to be completed in 1 week  · Will call with results      History of Present Illness  Venus Camara is a 46 y o  male here for evaluation of right flank pain and recurrent UTIs  Patient had presented to [facility] Department in March and April of this year for complaints of right flank pain, dysuria, frequency, and blood in the urine  Both visits he was diagnosed with a urinary tract infection with a positive urine culture for E coli  CT abdomen pelvis with contrast from 04/04/2022 revealed a subtle area of decreased enhancement in the lower pole of the right kidney suspicious for pyelonephritis    There was no hydronephrosis or obstructive uropathy  There is a tiny left renal cyst   Review of labs revealed a WBC of 12 28, negative lactic acid, and normal renal function  Urine culture was positive for 70,000-79,000 cfu/ml Escherichia coli  He received 1 dose of IV Levaquin 750 mg in the emergency department and was discharged home with Levaquin 500 mg daily for 7 days  He reports doing very well since his last emergency department visit  He denies any fevers, chills, gross hematuria, abdominal pain, or flank pain  He denies any history of urinary tract infections in the past   He believes that the reason he experienced the UTIs is because he was holding onto his urine at work and not voiding  Over the past month he has made a significant change in his voiding habits, making sure that he urinates several times a day as well as increasing his water intake from 1 bottle of water per day to over 64 oz per day  He has also been taking oral cranberry pills  He does drink coffee about 5-6 cups per day, but denies any urinary frequency or urgency  Overall he satisfied with his voiding habits, he denies frequency, urgency, weak urinary stream, or nocturia  CT abdomen pelvis from 04/04/2022 did identify an enlarged prostate  He denies any family history of prostate cancer  Review of Systems   Constitutional: Negative for chills and fever  HENT: Negative for congestion and sore throat  Respiratory: Negative for cough and shortness of breath  Cardiovascular: Negative for chest pain and leg swelling  Gastrointestinal: Negative for abdominal pain, constipation, diarrhea, nausea and vomiting  Genitourinary: Negative for decreased urine volume, difficulty urinating, dysuria, flank pain, frequency, hematuria, penile pain, penile swelling, testicular pain and urgency  Musculoskeletal: Negative for back pain and gait problem  Skin: Negative for wound  Allergic/Immunologic: Negative for immunocompromised state  Hematological: Does not bruise/bleed easily  Vitals  Vitals:    05/06/22 1407   BP: 130/74   Pulse: 80   Weight: 91 4 kg (201 lb 6 4 oz)   Height: 6' 1" (1 854 m)       Physical Exam  Vitals reviewed  Constitutional:       General: He is not in acute distress  Appearance: Normal appearance  He is not ill-appearing or toxic-appearing  HENT:      Head: Normocephalic and atraumatic  Eyes:      General: No scleral icterus  Conjunctiva/sclera: Conjunctivae normal    Cardiovascular:      Rate and Rhythm: Normal rate  Pulmonary:      Effort: Pulmonary effort is normal  No respiratory distress  Abdominal:      General: Abdomen is flat  Palpations: Abdomen is soft  Tenderness: There is no abdominal tenderness  There is no right CVA tenderness or left CVA tenderness  Hernia: No hernia is present  Genitourinary:     Comments: FAY reveals smooth symmetric prostate, no palpable nodules masses  Estimated gland size between 35-40 g  Musculoskeletal:      Cervical back: Normal range of motion  Right lower leg: No edema  Left lower leg: No edema  Skin:     General: Skin is warm and dry  Coloration: Skin is not jaundiced or pale  Neurological:      General: No focal deficit present  Mental Status: He is alert and oriented to person, place, and time  Mental status is at baseline  Gait: Gait normal    Psychiatric:         Mood and Affect: Mood normal          Behavior: Behavior normal          Thought Content:  Thought content normal          Judgment: Judgment normal          Past History  Past Medical History:   Diagnosis Date    Asthma     Disease of thyroid gland      Social History     Socioeconomic History    Marital status: /Civil Union     Spouse name: None    Number of children: None    Years of education: None    Highest education level: None   Occupational History    None   Tobacco Use    Smoking status: Never Smoker    Smokeless tobacco: Never Used   Vaping Use    Vaping Use: Never used   Substance and Sexual Activity    Alcohol use: Not Currently    Drug use: Never    Sexual activity: None   Other Topics Concern    None   Social History Narrative    None     Social Determinants of Health     Financial Resource Strain: Not on file   Food Insecurity: Not on file   Transportation Needs: Not on file   Physical Activity: Not on file   Stress: Not on file   Social Connections: Not on file   Intimate Partner Violence: Not on file   Housing Stability: Not on file     Social History     Tobacco Use   Smoking Status Never Smoker   Smokeless Tobacco Never Used     History reviewed  No pertinent family history  The following portions of the patient's history were reviewed and updated as appropriate allergies, current medications, past medical history, past social history, past surgical history and problem list    Imaging:    CT ABDOMEN AND PELVIS WITH IV CONTRAST      4/04/2022     INDICATION:   Flank pain, kidney stone suspected  RLQ abdominal pain (Age >= 14y)  Right-sided flank pain, CVA tenderness, right upper quadrant abdominal tenderness on examination with history of blood in urine      COMPARISON:  3/11/2022      TECHNIQUE:  CT examination of the abdomen and pelvis was performed  In addition to portal venous phase postcontrast scanning through the abdomen and pelvis, delayed phase postcontrast scanning was performed through the upper abdominal viscera  Axial,   sagittal, and coronal 2D reformatted images were created from the source data and submitted for interpretation      Radiation dose length product (DLP) for this visit:  960 39 mGy-cm     This examination, like all CT scans performed in the [facility], was performed utilizing techniques to minimize radiation dose exposure, including the use of iterative   reconstruction and automated exposure control      IV Contrast:  100 mL of iohexol (OMNIPAQUE)  Enteric Contrast:  Enteric contrast was not administered      FINDINGS:     ABDOMEN     LOWER CHEST:  No clinically significant abnormality identified in the visualized lower chest      LIVER/BILIARY TREE:  Unremarkable      GALLBLADDER:  No calcified gallstones  No pericholecystic inflammatory change      SPLEEN:  Unremarkable      PANCREAS:  Unremarkable      ADRENAL GLANDS:  Unremarkable      KIDNEYS/URETERS:  5 mm hypodense lesion in the lateral left kidney, too small to accurately characterize but most likely reflects a tiny cyst   There is a subtle region of decreased enhancement in the lower pole the right kidney suspicious for   pyelonephritis  No perinephric abscess      STOMACH AND BOWEL:  No bowel obstruction  There are some fluid-filled loops of small bowel without focal inflammatory process      APPENDIX:  A normal appendix was visualized      ABDOMINOPELVIC CAVITY:  No ascites  No pneumoperitoneum  No lymphadenopathy      VESSELS:  Unremarkable for patient's age      PELVIS     REPRODUCTIVE ORGANS:  The prostate is enlarged and heterogeneous      URINARY BLADDER:  Collapsed which limits evaluation      ABDOMINAL WALL/INGUINAL REGIONS:  Unremarkable      OSSEOUS STRUCTURES:  No acute fracture or destructive osseous lesion      IMPRESSION:     Subtle area of decreased enhancement in the lower pole the right kidney suspicious for pyelonephritis  No hydronephrosis or obstructive uropathy  Tiny left renal cyst      Normal appendix      Enlarged prostate  Results  No results found for this or any previous visit (from the past 1 hour(s))  ]  No results found for: PSA  Lab Results   Component Value Date    CALCIUM 8 6 04/04/2022    K 3 9 04/04/2022    CO2 29 04/04/2022     04/04/2022    BUN 11 04/04/2022    CREATININE 1 11 04/04/2022     Lab Results   Component Value Date    WBC 12 28 (H) 04/04/2022    HGB 13 5 04/04/2022    HCT 41 8 04/04/2022    MCV 91 04/04/2022     04/04/2022       Sena SPANGLER Corby Brown

## 2022-07-20 ENCOUNTER — OFFICE VISIT (OUTPATIENT)
Dept: URGENT CARE | Facility: MEDICAL CENTER | Age: 52
End: 2022-07-20
Payer: COMMERCIAL

## 2022-07-20 VITALS
BODY MASS INDEX: 26.14 KG/M2 | RESPIRATION RATE: 18 BRPM | HEART RATE: 70 BPM | OXYGEN SATURATION: 98 % | TEMPERATURE: 98.1 F | HEIGHT: 72 IN | WEIGHT: 193 LBS

## 2022-07-20 DIAGNOSIS — R05.9 COUGH: ICD-10-CM

## 2022-07-20 DIAGNOSIS — U07.1 COVID-19: Primary | ICD-10-CM

## 2022-07-20 PROCEDURE — U0005 INFEC AGEN DETEC AMPLI PROBE: HCPCS | Performed by: PHYSICIAN ASSISTANT

## 2022-07-20 PROCEDURE — 99212 OFFICE O/P EST SF 10 MIN: CPT | Performed by: PHYSICIAN ASSISTANT

## 2022-07-20 PROCEDURE — U0003 INFECTIOUS AGENT DETECTION BY NUCLEIC ACID (DNA OR RNA); SEVERE ACUTE RESPIRATORY SYNDROME CORONAVIRUS 2 (SARS-COV-2) (CORONAVIRUS DISEASE [COVID-19]), AMPLIFIED PROBE TECHNIQUE, MAKING USE OF HIGH THROUGHPUT TECHNOLOGIES AS DESCRIBED BY CMS-2020-01-R: HCPCS | Performed by: PHYSICIAN ASSISTANT

## 2022-07-20 NOTE — PROGRESS NOTES
[facility]        NAME: Jose Juan Tabares is a 46 y o  male  : 1970    MRN: [id number]  DATE: 2022  TIME: 1:02 PM    Assessment and Plan   COVID-19 [U07 1]  1  COVID-19     2  Cough  COVID Only - Collected at [facility] or [facility]         Patient Instructions       Follow up with PCP in 3-5 days  Proceed to  ER if symptoms worsen  Chief Complaint     Chief Complaint   Patient presents with    COVID-19     Positive home covid test  c/o cough and nasal congestion         History of Present Illness       Patient presents with a four-day history of body aches cough congestion  He had fever and chills on 1 day which has subsided  His son and daughter tested positive for COVID in the emergency room  He did a home test which was positive, however, his employer is requesting PCR testing  Review of Systems   Review of Systems   Constitutional: Positive for chills and fever  HENT: Positive for congestion  Negative for rhinorrhea and sore throat  Respiratory: Positive for cough  Negative for shortness of breath  Gastrointestinal: Negative for diarrhea, nausea and vomiting  Musculoskeletal: Positive for myalgias  Current Medications     No current outpatient medications on file  Current Allergies     Allergies as of 2022    (No Known Allergies)            The following portions of the patient's history were reviewed and updated as appropriate: allergies, current medications, past family history, past medical history, past social history, past surgical history and problem list      Past Medical History:   Diagnosis Date    Asthma     Disease of thyroid gland        History reviewed  No pertinent surgical history  History reviewed  No pertinent family history  Medications have been verified          Objective   Pulse 70   Temp 98 1 °F (36 7 °C)   Resp 18   Ht 6' (1 829 m)   Wt 87 5 kg (193 lb)   SpO2 98%   BMI 26 18 kg/m²   No LMP for male patient  Physical Exam     Physical Exam  Vitals and nursing note reviewed  Constitutional:       Appearance: Normal appearance  HENT:      Head: Normocephalic and atraumatic  Nose: Nose normal    Eyes:      Conjunctiva/sclera: Conjunctivae normal    Cardiovascular:      Rate and Rhythm: Normal rate and regular rhythm  Heart sounds: Normal heart sounds  Pulmonary:      Effort: Pulmonary effort is normal       Breath sounds: Normal breath sounds  Skin:     General: Skin is warm  Neurological:      Mental Status: He is alert

## 2022-07-21 LAB — SARS-COV-2 RNA RESP QL NAA+PROBE: POSITIVE

## 2023-11-01 ENCOUNTER — OFFICE VISIT (OUTPATIENT)
Dept: FAMILY MEDICINE CLINIC | Facility: CLINIC | Age: 53
End: 2023-11-01
Payer: COMMERCIAL

## 2023-11-01 VITALS
DIASTOLIC BLOOD PRESSURE: 72 MMHG | OXYGEN SATURATION: 98 % | HEIGHT: 72 IN | HEART RATE: 78 BPM | BODY MASS INDEX: 26.98 KG/M2 | WEIGHT: 199.2 LBS | SYSTOLIC BLOOD PRESSURE: 122 MMHG

## 2023-11-01 DIAGNOSIS — Z12.5 SCREENING FOR PROSTATE CANCER: ICD-10-CM

## 2023-11-01 DIAGNOSIS — Z12.11 SCREENING FOR COLON CANCER: ICD-10-CM

## 2023-11-01 DIAGNOSIS — Z13.9 SCREENING FOR UNSPECIFIED CONDITION: ICD-10-CM

## 2023-11-01 DIAGNOSIS — M54.42 ACUTE LEFT-SIDED LOW BACK PAIN WITH LEFT-SIDED SCIATICA: Primary | ICD-10-CM

## 2023-11-01 PROCEDURE — 99204 OFFICE O/P NEW MOD 45 MIN: CPT | Performed by: PHYSICIAN ASSISTANT

## 2023-11-01 RX ORDER — PREDNISONE 10 MG/1
TABLET ORAL
Qty: 30 TABLET | Refills: 0 | Status: SHIPPED | OUTPATIENT
Start: 2023-11-01 | End: 2023-11-13

## 2023-11-01 RX ORDER — CYCLOBENZAPRINE HCL 5 MG
5 TABLET ORAL 3 TIMES DAILY PRN
Qty: 30 TABLET | Refills: 0 | Status: SHIPPED | OUTPATIENT
Start: 2023-11-01

## 2023-11-01 NOTE — PROGRESS NOTES
Name: Angelina Goldberg      : 1970      MRN: [id number]  Encounter Provider: Mitchell Salas PA-C  Encounter Date: 2023   Encounter department: [address]     1. Acute left-sided low back pain with left-sided sciatica  Assessment & Plan:  Prescription for prednisone taper. Prescription for Flexeril. Recommended that he use caution and not take medication prior to operating motor vehicle or machinery. Advised him to take prednisone with food. Referral placed to physical therapy. X-ray ordered of lumbar spine. If no relief, he will let us know. Orders:  -     XR spine lumbar minimum 4 views non injury; Future; Expected date: 2023  -     cyclobenzaprine (FLEXERIL) 5 mg tablet; Take 1 tablet (5 mg total) by mouth 3 (three) times a day as needed for muscle spasms  -     predniSONE 10 mg tablet; Take 4 tablets (40 mg total) by mouth daily for 3 days, THEN 3 tablets (30 mg total) daily for 3 days, THEN 2 tablets (20 mg total) daily for 3 days, THEN 1 tablet (10 mg total) daily for 3 days. -     Ambulatory Referral to Physical Therapy; Future    2. Screening for colon cancer  -     Cologuard    3. Screening for unspecified condition  -     CBC and differential; Future  -     Comprehensive metabolic panel; Future  -     Lipid panel; Future  -     TSH, 3rd generation with Free T4 reflex; Future    4. Screening for prostate cancer  -     PSA, Total Screen; Future      BMI Counseling: Body mass index is 27.02 kg/m². The BMI is above normal. Nutrition recommendations include encouraging healthy choices of fruits and vegetables. Exercise recommendations include exercising 3-5 times per week. Rationale for BMI follow-up plan is due to patient being overweight or obese. Depression Screening and Follow-up Plan: Patient was screened for depression during today's encounter. They screened negative with a PHQ-2 score of 2.         Subjective      [place] is a pleasant 49-year-old male who is here today to establish care. He is complaining of left lower back pain that radiates into his left leg for the past 3 weeks. He denies any known injuries. He has been trying ibuprofen and IcyHot, but it is not providing relief. He denies any bowel incontinence, bladder incontinence, or saddle anesthesia. He had a similar issue on the right side in the past, which improved with physical therapy. He admits that he has difficulty tolerating NSAIDs due to GI upset. He denies any other medical history. He would like to do a screening for colon cancer. He is due for baseline labs. He admits that he was told he had a thyroid problem in the past, but is unsure of the type. He never took any medications. He was referred to a specialist in [place], but never followed through. Review of Systems   Constitutional:  Negative for chills, diaphoresis, fatigue and fever. HENT:  Negative for congestion, ear pain, postnasal drip, rhinorrhea, sneezing, sore throat and trouble swallowing. Eyes:  Negative for pain and visual disturbance. Respiratory:  Negative for apnea, cough, shortness of breath and wheezing. Cardiovascular:  Negative for chest pain and palpitations. Gastrointestinal:  Negative for abdominal pain, constipation, diarrhea, nausea and vomiting. Genitourinary:  Negative for dysuria and hematuria. Musculoskeletal:  Positive for arthralgias and back pain. Negative for gait problem and myalgias. Neurological:  Negative for dizziness, syncope, weakness, light-headedness, numbness and headaches. Psychiatric/Behavioral:  Negative for suicidal ideas. The patient is not nervous/anxious. No current outpatient medications on file prior to visit. Objective     /72   Pulse 78   Ht 6' (1.829 m)   Wt 90.4 kg (199 lb 3.2 oz)   SpO2 98%   BMI 27.02 kg/m²     Physical Exam  Vitals and nursing note reviewed.    Constitutional:       Appearance: He is well-developed. HENT:      Head: Normocephalic and atraumatic. Right Ear: External ear normal.      Left Ear: External ear normal.      Nose: Nose normal.      Mouth/Throat:      Pharynx: No oropharyngeal exudate or posterior oropharyngeal erythema. Eyes:      Extraocular Movements: Extraocular movements intact. Cardiovascular:      Rate and Rhythm: Normal rate and regular rhythm. Heart sounds: Normal heart sounds. No murmur heard. No friction rub. No gallop. Pulmonary:      Effort: Pulmonary effort is normal. No respiratory distress. Breath sounds: Normal breath sounds. No wheezing or rales. Abdominal:      Palpations: Abdomen is soft. Tenderness: There is no abdominal tenderness. Musculoskeletal:      Cervical back: Normal range of motion and neck supple. Lumbar back: Spasms (left sided lumbar paraspinal muscles) and tenderness (lumbar paraspinal muscles) present. Decreased range of motion. Lymphadenopathy:      Cervical: No cervical adenopathy. Skin:     General: Skin is warm and dry. Neurological:      Mental Status: He is alert and oriented to person, place, and time. Psychiatric:         Behavior: Behavior normal.         Thought Content:  Thought content normal.         Judgment: Judgment normal.       Momo Gallagher PA-C

## 2023-11-01 NOTE — ASSESSMENT & PLAN NOTE
Prescription for prednisone taper. Prescription for Flexeril. Recommended that he use caution and not take medication prior to operating motor vehicle or machinery. Advised him to take prednisone with food. Referral placed to physical therapy. X-ray ordered of lumbar spine. If no relief, he will let us know.

## 2023-11-02 ENCOUNTER — APPOINTMENT (OUTPATIENT)
Dept: LAB | Facility: HOSPITAL | Age: 53
End: 2023-11-02
Payer: COMMERCIAL

## 2023-11-02 ENCOUNTER — HOSPITAL ENCOUNTER (OUTPATIENT)
Dept: RADIOLOGY | Facility: HOSPITAL | Age: 53
Discharge: HOME | End: 2023-11-02
Payer: COMMERCIAL

## 2023-11-02 DIAGNOSIS — Z12.5 SCREENING FOR PROSTATE CANCER: ICD-10-CM

## 2023-11-02 DIAGNOSIS — Z13.9 SCREENING FOR UNSPECIFIED CONDITION: ICD-10-CM

## 2023-11-02 DIAGNOSIS — M54.42 ACUTE LEFT-SIDED LOW BACK PAIN WITH LEFT-SIDED SCIATICA: ICD-10-CM

## 2023-11-02 LAB
ALBUMIN SERPL BCP-MCNC: 4.3 G/DL (ref 3.5–5)
ALP SERPL-CCNC: 51 U/L (ref 34–104)
ALT SERPL W P-5'-P-CCNC: 16 U/L (ref 7–52)
ANION GAP SERPL CALCULATED.3IONS-SCNC: 5 MMOL/L
AST SERPL W P-5'-P-CCNC: 18 U/L (ref 13–39)
BASOPHILS # BLD AUTO: 0.06 THOUSANDS/ÂΜL (ref 0–0.1)
BASOPHILS NFR BLD AUTO: 1 % (ref 0–1)
BILIRUB SERPL-MCNC: 0.61 MG/DL (ref 0.2–1)
BUN SERPL-MCNC: 12 MG/DL (ref 5–25)
CALCIUM SERPL-MCNC: 9.2 MG/DL (ref 8.4–10.2)
CHLORIDE SERPL-SCNC: 103 MMOL/L (ref 96–108)
CHOLEST SERPL-MCNC: 132 MG/DL
CO2 SERPL-SCNC: 29 MMOL/L (ref 21–32)
CREAT SERPL-MCNC: 1.08 MG/DL (ref 0.6–1.3)
EOSINOPHIL # BLD AUTO: 0.29 THOUSAND/ÂΜL (ref 0–0.61)
EOSINOPHIL NFR BLD AUTO: 4 % (ref 0–6)
ERYTHROCYTE [DISTWIDTH] IN BLOOD BY AUTOMATED COUNT: 12.8 % (ref 11.6–15.1)
GFR SERPL CREATININE-BSD FRML MDRD: 77 ML/MIN/1.73SQ M
GLUCOSE P FAST SERPL-MCNC: 86 MG/DL (ref 65–99)
HCT VFR BLD AUTO: 44.2 % (ref 36.5–49.3)
HDLC SERPL-MCNC: 45 MG/DL
HGB BLD-MCNC: 14.2 G/DL (ref 12–17)
IMM GRANULOCYTES # BLD AUTO: 0.01 THOUSAND/UL (ref 0–0.2)
IMM GRANULOCYTES NFR BLD AUTO: 0 % (ref 0–2)
LDLC SERPL CALC-MCNC: 76 MG/DL (ref 0–100)
LYMPHOCYTES # BLD AUTO: 3.42 THOUSANDS/ÂΜL (ref 0.6–4.47)
LYMPHOCYTES NFR BLD AUTO: 42 % (ref 14–44)
MCH RBC QN AUTO: 29.7 PG (ref 26.8–34.3)
MCHC RBC AUTO-ENTMCNC: 32.1 G/DL (ref 31.4–37.4)
MCV RBC AUTO: 93 FL (ref 82–98)
MONOCYTES # BLD AUTO: 0.86 THOUSAND/ÂΜL (ref 0.17–1.22)
MONOCYTES NFR BLD AUTO: 11 % (ref 4–12)
NEUTROPHILS # BLD AUTO: 3.31 THOUSANDS/ÂΜL (ref 1.85–7.62)
NEUTS SEG NFR BLD AUTO: 42 % (ref 43–75)
NONHDLC SERPL-MCNC: 87 MG/DL
NRBC BLD AUTO-RTO: 0 /100 WBCS
PLATELET # BLD AUTO: 204 THOUSANDS/UL (ref 149–390)
PMV BLD AUTO: 8.4 FL (ref 8.9–12.7)
POTASSIUM SERPL-SCNC: 4 MMOL/L (ref 3.5–5.3)
PROT SERPL-MCNC: 6.9 G/DL (ref 6.4–8.4)
PSA SERPL-MCNC: 0.72 NG/ML (ref 0–4)
RBC # BLD AUTO: 4.78 MILLION/UL (ref 3.88–5.62)
SODIUM SERPL-SCNC: 137 MMOL/L (ref 135–147)
TRIGL SERPL-MCNC: 56 MG/DL
TSH SERPL DL<=0.05 MIU/L-ACNC: 1.54 UIU/ML (ref 0.45–4.5)
WBC # BLD AUTO: 7.95 THOUSAND/UL (ref 4.31–10.16)

## 2023-11-02 PROCEDURE — 84443 ASSAY THYROID STIM HORMONE: CPT

## 2023-11-02 PROCEDURE — 80053 COMPREHEN METABOLIC PANEL: CPT

## 2023-11-02 PROCEDURE — 85025 COMPLETE CBC W/AUTO DIFF WBC: CPT

## 2023-11-02 PROCEDURE — G0103 PSA SCREENING: HCPCS

## 2023-11-02 PROCEDURE — 36415 COLL VENOUS BLD VENIPUNCTURE: CPT

## 2023-11-02 PROCEDURE — 80061 LIPID PANEL: CPT

## 2023-11-02 PROCEDURE — 72110 X-RAY EXAM L-2 SPINE 4/>VWS: CPT

## 2023-11-22 LAB — COLOGUARD RESULT REPORTABLE: NEGATIVE

## 2023-11-24 ENCOUNTER — EVALUATION (OUTPATIENT)
Dept: PHYSICAL THERAPY | Facility: HOME HEALTHCARE | Age: 53
End: 2023-11-24
Payer: COMMERCIAL

## 2023-11-24 DIAGNOSIS — M54.42 ACUTE LEFT-SIDED LOW BACK PAIN WITH LEFT-SIDED SCIATICA: Primary | ICD-10-CM

## 2023-11-24 PROCEDURE — 97162 PT EVAL MOD COMPLEX 30 MIN: CPT

## 2023-11-24 PROCEDURE — 97110 THERAPEUTIC EXERCISES: CPT

## 2023-11-24 NOTE — PROGRESS NOTES
PT Evaluation     Today's date: 2023  Patient name: Geraldine Hall  : 1970  MRN: [id number]  Referring provider: Jovan Boyd  Dx:   Encounter Diagnosis     ICD-10-CM    1. Acute left-sided low back pain with left-sided sciatica  M54.42 Ambulatory Referral to Physical Therapy          Start Time: 0710  Stop Time: 0745  Total time in clinic (min): 35 minutes    Assessment  Assessment details: Pt is a 47 y/o male presenting to OPPT with acute L sided low back pain with radicular symptoms into his LLE consistent with diagnosis of plausible lumbar radiculopathy and paraspinal muscle spasms. The pt is presenting with significant pain levels, ROM deficits, strength deficits, and decreased functional mobility, which is affecting his ability to perform ADLs. The pt's radicular symptoms peripheralized with flexion and centralized with extension, indicating an extension bias to his symptoms. The pt requires skilled PT in order to improve in pain, strength, ROM, functional mobility, quality of life, and return to PLOF. Thank you. Impairments: abnormal or restricted ROM, activity intolerance, impaired physical strength, lacks appropriate home exercise program, pain with function, weight-bearing intolerance, poor posture  and poor body mechanics  Understanding of Dx/Px/POC: good   Prognosis: good    Goals  STG: 3-4 weeks  Pt will be independent with HEP in order to continue to progress outside of PT treatment sessions. Pt will improve in quality of life as demonstrated by worst pain levels of 5/10 or less. Pt will improve in functional mobility as demonstrated by min-mod restrictions in lumbar AROM. LT-8 weeks  Pt will improve in quality of life as demonstrated by worst pain levels of 2/10 or less. Pt will improve in functional mobility as demonstrated by strength levels of 4+/5 or greater. Pt will improve in functional mobility as demonstrated by lumbar AROM WFL.   Pt will improve in functional mobility as demonstrated by full centralization of lumbar radicular symptoms. Pt will improve in functional mobility as demonstrated by ability to perform full ADLs without any limitations due to pain, ROM deficits, or weakness. Plan  Patient would benefit from: skilled physical therapy  Planned modality interventions: cryotherapy, low level laser therapy and thermotherapy: hydrocollator packs  Planned therapy interventions: body mechanics training, flexibility, functional ROM exercises, home exercise program, IASTM, joint mobilization, manual therapy, massage, neuromuscular re-education, patient education, postural training, strengthening, stretching, therapeutic activities and therapeutic exercise  Frequency: 2x week  Duration in weeks: 12  Plan of Care beginning date: 2023  Plan of Care expiration date: 2024  Treatment plan discussed with: patient        Subjective Evaluation    History of Present Illness  Mechanism of injury: Pt is presenting to OPPT with acute L sided low back pain, which then shoots into his L posterior thigh/knee, which started approximately 1 month ago. He did have these symptoms in the past on his R side, but found relief with PT and exercises. He reports that lately he hasn't been working physically as much, and has been doing more sitting and desk work at [employer]. He reports that when he did physical work, he would have less problems with his back. He reports that when he sits a lot, he feels irritation in his L hip area and then pain when he goes to stand up.   Patient Goals  Patient goals for therapy: decreased pain, increased motion, increased strength, independence with ADLs/IADLs and return to sport/leisure activities  Patient goal: To help with the pain  Pain  Current pain ratin  At best pain ratin  At worst pain ratin  Location: L sided low back, lateral hip/groin, posterior thigh  Quality: sharp and knife-like  Relieving factors: medications (Muscle relaxor)  Aggravating factors: sitting and stair climbing (Getting up from sitting, getting out of the car)    Social Support  Stairs in house: yes (1 flight)       Diagnostic Tests  X-ray: normal  Treatments  Previous treatment: physical therapy  Current treatment: medication        Objective     Postural Observations  Seated posture: poor  Standing posture: poor      Palpation   Left   Muscle spasm in the erector spinae, lumbar paraspinals and quadratus lumborum. Tenderness of the erector spinae, lumbar paraspinals and quadratus lumborum. Trigger point to erector spinae, lumbar paraspinals and quadratus lumborum.      Neurological Testing     Sensation     Lumbar   Left   Intact: light touch    Right   Intact: light touch    Active Range of Motion     Lumbar   Flexion:  with pain Restriction level: moderate  Extension:  with pain Restriction level: minimal  Left lateral flexion:  WFL Restriction level: minimal  Right lateral flexion:  with pain Restriction level: minimal  Left rotation:  with pain Restriction level: minimal  Right rotation:  with pain Restriction level: minimal    Passive Range of Motion   Left Hip   Normal passive range of motion    Right Hip   Normal passive range of motion    Joint Play     Pain: L1, L2, L3, L4, L5 and S1   Mechanical Assessment    Cervical      Thoracic      Lumbar    Standing flexion: repeated movements   Pain location:peripheralized  Standing extension: repeated movements  Pain location: centralized  Lying extension: repeated movements  Pain location: centralized    Strength/Myotome Testing     Left Hip   Planes of Motion   Flexion: 4  Abduction: 4  Adduction: 5    Right Hip   Planes of Motion   Flexion: 5  Abduction: 4  Adduction: 5    Left Knee   Flexion: 4  Extension: 5    Right Knee   Flexion: 5  Extension: 5    Left Ankle/Foot   Dorsiflexion: 5  Plantar flexion: 4+  Great toe extension: 4+    Right Ankle/Foot   Dorsiflexion: 5  Plantar flexion: 4+  Great toe extension: 5    Tests     Lumbar     Left   Positive passive SLR and slump test.     Left Hip   SLR: Positive. Right Hip   SLR: Positive.      Additional Tests Details  SLR: L: 40, R: 55             Precautions: None    Re-eval Date: 12/24/2023      Manuals 11/24            IASTM lumbar paraspinals NV            Robert hs stretch             Laser L1-S1                          Neuro Re-Ed             TrA activation             PPT             PPT w/ marches             PPT w/ SLR             Palloff press                          Posture education 5'            Ther Ex             [facility]             LTR             Standing lumbar ext HEP            Prone on elbows HEP            Prone press-up             Bridges HEP            SLR             Clamshells             S/L hip abd             Prone hip ext             Squat                          Ther Activity                                       Gait Training                                       Modalities             P

## 2024-03-22 ENCOUNTER — TELEPHONE (OUTPATIENT)
Dept: FAMILY MEDICINE CLINIC | Facility: CLINIC | Age: 54
End: 2024-03-22

## 2024-03-23 ENCOUNTER — APPOINTMENT (EMERGENCY)
Dept: CT IMAGING | Facility: HOSPITAL | Age: 54
End: 2024-03-23
Payer: COMMERCIAL

## 2024-03-23 ENCOUNTER — HOSPITAL ENCOUNTER (EMERGENCY)
Facility: HOSPITAL | Age: 54
Discharge: HOME | End: 2024-03-24
Attending: EMERGENCY MEDICINE
Payer: COMMERCIAL

## 2024-03-23 DIAGNOSIS — R93.89 ABNORMAL COMPUTED TOMOGRAPHY ANGIOGRAPHY (CTA) OF NECK: Primary | ICD-10-CM

## 2024-03-23 DIAGNOSIS — R42 VERTIGO: ICD-10-CM

## 2024-03-23 LAB
2HR DELTA HS TROPONIN: 4 NG/L
ALBUMIN SERPL BCP-MCNC: 4.2 G/DL (ref 3.5–5)
ALP SERPL-CCNC: 56 U/L (ref 34–104)
ALT SERPL W P-5'-P-CCNC: 32 U/L (ref 7–52)
ANION GAP SERPL CALCULATED.3IONS-SCNC: 7 MMOL/L (ref 4–13)
APTT PPP: 27 SECONDS (ref 23–37)
AST SERPL W P-5'-P-CCNC: 24 U/L (ref 13–39)
ATRIAL RATE: 60 BPM
BASOPHILS # BLD AUTO: 0.05 THOUSANDS/ÂΜL (ref 0–0.1)
BASOPHILS NFR BLD AUTO: 1 % (ref 0–1)
BILIRUB SERPL-MCNC: 1.36 MG/DL (ref 0.2–1)
BUN SERPL-MCNC: 11 MG/DL (ref 5–25)
CALCIUM SERPL-MCNC: 9.4 MG/DL (ref 8.4–10.2)
CARDIAC TROPONIN I PNL SERPL HS: 11 NG/L
CARDIAC TROPONIN I PNL SERPL HS: 7 NG/L
CHLORIDE SERPL-SCNC: 104 MMOL/L (ref 96–108)
CO2 SERPL-SCNC: 29 MMOL/L (ref 21–32)
CREAT SERPL-MCNC: 1.03 MG/DL (ref 0.6–1.3)
EOSINOPHIL # BLD AUTO: 0.08 THOUSAND/ÂΜL (ref 0–0.61)
EOSINOPHIL NFR BLD AUTO: 1 % (ref 0–6)
ERYTHROCYTE [DISTWIDTH] IN BLOOD BY AUTOMATED COUNT: 13 % (ref 11.6–15.1)
FLUAV RNA RESP QL NAA+PROBE: NEGATIVE
FLUBV RNA RESP QL NAA+PROBE: NEGATIVE
GFR SERPL CREATININE-BSD FRML MDRD: 81 ML/MIN/1.73SQ M
GLUCOSE SERPL-MCNC: 77 MG/DL (ref 65–140)
HCT VFR BLD AUTO: 45 % (ref 36.5–49.3)
HGB BLD-MCNC: 14.5 G/DL (ref 12–17)
IMM GRANULOCYTES # BLD AUTO: 0.02 THOUSAND/UL (ref 0–0.2)
IMM GRANULOCYTES NFR BLD AUTO: 0 % (ref 0–2)
INR PPP: 1.03 (ref 0.84–1.19)
LYMPHOCYTES # BLD AUTO: 3.17 THOUSANDS/ÂΜL (ref 0.6–4.47)
LYMPHOCYTES NFR BLD AUTO: 34 % (ref 14–44)
MCH RBC QN AUTO: 29.2 PG (ref 26.8–34.3)
MCHC RBC AUTO-ENTMCNC: 32.2 G/DL (ref 31.4–37.4)
MCV RBC AUTO: 91 FL (ref 82–98)
MONOCYTES # BLD AUTO: 0.84 THOUSAND/ÂΜL (ref 0.17–1.22)
MONOCYTES NFR BLD AUTO: 9 % (ref 4–12)
NEUTROPHILS # BLD AUTO: 5.24 THOUSANDS/ÂΜL (ref 1.85–7.62)
NEUTS SEG NFR BLD AUTO: 55 % (ref 43–75)
NRBC BLD AUTO-RTO: 0 /100 WBCS
P AXIS: 60 DEGREES
PLATELET # BLD AUTO: 226 THOUSANDS/UL (ref 149–390)
PMV BLD AUTO: 8.9 FL (ref 8.9–12.7)
POTASSIUM SERPL-SCNC: 3.4 MMOL/L (ref 3.5–5.3)
PR INTERVAL: 148 MS
PROT SERPL-MCNC: 7 G/DL (ref 6.4–8.4)
PROTHROMBIN TIME: 13.4 SECONDS (ref 11.6–14.5)
QRS AXIS: 96 DEGREES
QRSD INTERVAL: 100 MS
QT INTERVAL: 400 MS
QTC INTERVAL: 400 MS
RBC # BLD AUTO: 4.96 MILLION/UL (ref 3.88–5.62)
RSV RNA RESP QL NAA+PROBE: NEGATIVE
SARS-COV-2 RNA RESP QL NAA+PROBE: NEGATIVE
SODIUM SERPL-SCNC: 140 MMOL/L (ref 135–147)
T WAVE AXIS: 21 DEGREES
VENTRICULAR RATE: 60 BPM
WBC # BLD AUTO: 9.4 THOUSAND/UL (ref 4.31–10.16)

## 2024-03-23 PROCEDURE — 93005 ELECTROCARDIOGRAM TRACING: CPT

## 2024-03-23 PROCEDURE — 99285 EMERGENCY DEPT VISIT HI MDM: CPT | Performed by: EMERGENCY MEDICINE

## 2024-03-23 PROCEDURE — 80053 COMPREHEN METABOLIC PANEL: CPT | Performed by: EMERGENCY MEDICINE

## 2024-03-23 PROCEDURE — 85610 PROTHROMBIN TIME: CPT | Performed by: EMERGENCY MEDICINE

## 2024-03-23 PROCEDURE — 70496 CT ANGIOGRAPHY HEAD: CPT

## 2024-03-23 PROCEDURE — 96360 HYDRATION IV INFUSION INIT: CPT

## 2024-03-23 PROCEDURE — 85025 COMPLETE CBC W/AUTO DIFF WBC: CPT | Performed by: EMERGENCY MEDICINE

## 2024-03-23 PROCEDURE — 99285 EMERGENCY DEPT VISIT HI MDM: CPT

## 2024-03-23 PROCEDURE — 36415 COLL VENOUS BLD VENIPUNCTURE: CPT | Performed by: EMERGENCY MEDICINE

## 2024-03-23 PROCEDURE — 85730 THROMBOPLASTIN TIME PARTIAL: CPT | Performed by: EMERGENCY MEDICINE

## 2024-03-23 PROCEDURE — 84484 ASSAY OF TROPONIN QUANT: CPT | Performed by: EMERGENCY MEDICINE

## 2024-03-23 PROCEDURE — 70498 CT ANGIOGRAPHY NECK: CPT

## 2024-03-23 PROCEDURE — 0241U HB NFCT DS VIR RESP RNA 4 TRGT: CPT | Performed by: EMERGENCY MEDICINE

## 2024-03-23 RX ORDER — MECLIZINE HCL 12.5 MG/1
25 TABLET ORAL ONCE
Status: COMPLETED | OUTPATIENT
Start: 2024-03-23 | End: 2024-03-23

## 2024-03-23 RX ADMIN — IOHEXOL 85 ML: 350 INJECTION, SOLUTION INTRAVENOUS at 21:00

## 2024-03-23 RX ADMIN — SODIUM CHLORIDE 1000 ML: 0.9 INJECTION, SOLUTION INTRAVENOUS at 19:53

## 2024-03-23 RX ADMIN — MECLIZINE HYDROCHLORIDE 25 MG: 12.5 TABLET ORAL at 19:51

## 2024-03-23 NOTE — Clinical Note
Jeffrey Vann was seen and treated in our emergency department on 3/23/2024.                Diagnosis:     Jeffrey  .    He may return on this date:     Patient seen and examined in the Emergency Department on 3/23/2024 through 3/24/2024  Patient may return to work on Monday, 3/25/2024 if symptoms improve/resolve.  If symptoms persist patient should be excused 3/25/2024 and seek reevaluation for further restrictions if needed.     If you have any questions or concerns, please don't hesitate to call.      Priyank Frazier, DO    ______________________________           _______________          _______________  Hospital Representative                              Date                                Time

## 2024-03-24 VITALS
RESPIRATION RATE: 17 BRPM | HEIGHT: 72 IN | OXYGEN SATURATION: 98 % | DIASTOLIC BLOOD PRESSURE: 62 MMHG | WEIGHT: 193 LBS | BODY MASS INDEX: 26.14 KG/M2 | SYSTOLIC BLOOD PRESSURE: 116 MMHG | HEART RATE: 64 BPM | TEMPERATURE: 97.8 F

## 2024-03-24 RX ORDER — MECLIZINE HYDROCHLORIDE 25 MG/1
25 TABLET ORAL 3 TIMES DAILY PRN
Qty: 30 TABLET | Refills: 0 | Status: SHIPPED | OUTPATIENT
Start: 2024-03-24

## 2024-03-24 NOTE — ED PROVIDER NOTES
History  Chief Complaint   Patient presents with    Dizziness     Pt presents with the feeling that the room is spinning especially when first standing up       Dizziness  Associated symptoms: no chest pain, no diarrhea, no headaches, no nausea, no palpitations, no shortness of breath, no vomiting and no weakness    This is a 54-year-old male with past medical history significant for asthma, PTSD, presenting to the emergency department for evaluation of vertigo.    Patient describes dizziness which she specifies as a room spinning sensation.  It began on Thursday, 3 days ago.  The patient denies any preceding symptoms or history of similar in the past.  He reports a sensation of room spinning which occurs when he sits up or turns his head suddenly and improves/resolves with prolonged rest.  He attempted to rest and sleep off the symptoms but they did not improve and he decided to seek evaluation.  During examination he endorses a tingling sensation behind the left mastoid region/occipital region which is mild and intermittent he denies any extremity symptoms any falls or trauma any neck pain any facial droop, difficulty with speech, visual disturbance other than the spinning sensation denies diplopia, vision loss.  Denies a history of stroke.  Denies a history of diabetes hypertension or kidney disease.  Takes no blood thinners.  Denies trouble swallowing, chest pain, shortness of breath.  Feels like gait is off balance when he ambulates.  Taking nothing for symptoms.    Prior to Admission Medications   Prescriptions Last Dose Informant Patient Reported? Taking?   cyclobenzaprine (FLEXERIL) 5 mg tablet   No No   Sig: Take 1 tablet (5 mg total) by mouth 3 (three) times a day as needed for muscle spasms      Facility-Administered Medications: None       Past Medical History:   Diagnosis Date    Asthma     Disease of thyroid gland     PTSD (post-traumatic stress disorder)        Past Surgical History:   Procedure  Laterality Date    HERNIA REPAIR      2013    WRIST ARTHROPLASTY Right     2015       Family History   Problem Relation Age of Onset    Diabetes Father     Asthma Father      I have reviewed and agree with the history as documented.    E-Cigarette/Vaping    E-Cigarette Use Never User      E-Cigarette/Vaping Substances    Nicotine No     THC No     CBD No     Flavoring No     Other No     Unknown No      Social History     Tobacco Use    Smoking status: Never    Smokeless tobacco: Never   Vaping Use    Vaping status: Never Used   Substance Use Topics    Alcohol use: Not Currently    Drug use: Never       Review of Systems   Constitutional:  Positive for activity change. Negative for chills and fever.   HENT:  Negative for congestion, ear pain and sore throat.    Eyes:  Negative for photophobia, pain and visual disturbance.   Respiratory:  Negative for cough and shortness of breath.    Cardiovascular:  Negative for chest pain, palpitations and leg swelling.   Gastrointestinal:  Negative for abdominal pain, diarrhea, nausea and vomiting.   Genitourinary:  Negative for dysuria and hematuria.   Musculoskeletal:  Negative for arthralgias, back pain, neck pain and neck stiffness.   Skin:  Negative for color change and rash.   Neurological:  Positive for dizziness. Negative for tremors, seizures, syncope, facial asymmetry, speech difficulty, weakness, light-headedness, numbness and headaches.   All other systems reviewed and are negative.      Physical Exam  Physical Exam  Vitals and nursing note reviewed. Exam conducted with a chaperone present.   Constitutional:       General: He is not in acute distress.     Appearance: Normal appearance. He is not ill-appearing, toxic-appearing or diaphoretic.   HENT:      Head: Normocephalic and atraumatic.      Right Ear: External ear normal.      Left Ear: External ear normal.      Nose: Nose normal.      Mouth/Throat:      Mouth: Mucous membranes are moist.      Pharynx: Oropharynx  is clear.   Eyes:      General: No visual field deficit.     Conjunctiva/sclera: Conjunctivae normal.   Cardiovascular:      Rate and Rhythm: Normal rate and regular rhythm.      Pulses: Normal pulses.      Heart sounds: Normal heart sounds.   Pulmonary:      Effort: Pulmonary effort is normal. No respiratory distress.   Abdominal:      General: Abdomen is flat.      Tenderness: There is no abdominal tenderness. There is no guarding or rebound.   Musculoskeletal:      Cervical back: Neck supple.      Right lower leg: No edema.      Left lower leg: No edema.   Skin:     General: Skin is warm and dry.      Capillary Refill: Capillary refill takes less than 2 seconds.   Neurological:      Mental Status: He is alert.      GCS: GCS eye subscore is 4. GCS verbal subscore is 5. GCS motor subscore is 6.      Cranial Nerves: No dysarthria or facial asymmetry.      Motor: Motor function is intact. No tremor or abnormal muscle tone.      Coordination: Coordination is intact.      Gait: Gait is intact.      Comments: Right beating horizontal fatigable nystagmus with extraocular movement testing.    Delayed onset of nystagmus with [place]-Hallpike testing.   Appears to have fast beating nystagmus towards right ear when tested    Patient describes paresthesia in the left occipital region behind the mastoid when turning head to the left no numbness.         Vital Signs  ED Triage Vitals   Temperature Pulse Respirations Blood Pressure SpO2   03/23/24 1857 03/23/24 1859 03/23/24 1859 03/23/24 1859 03/23/24 1859   97.8 °F (36.6 °C) 63 16 154/88 98 %      Temp Source Heart Rate Source Patient Position - Orthostatic VS BP Location FiO2 (%)   03/23/24 1857 03/23/24 1859 03/23/24 1859 03/23/24 1859 --   Temporal Monitor Sitting Right arm       Pain Score       03/23/24 1859       No Pain           Vitals:    03/23/24 2200 03/23/24 2230 03/23/24 2300 03/24/24 0149   BP: 115/79 148/94 116/62 116/62   Pulse: 64 65 64 64   Patient Position -  Orthostatic VS:  Lying Lying          Visual Acuity  Visual Acuity      Flowsheet Row Most Recent Value   L Pupil Size (mm) 3   R Pupil Size (mm) 3            ED Medications  Medications   sodium chloride 0.9 % bolus 1,000 mL (0 mL Intravenous Stopped 3/23/24 2053)   meclizine (ANTIVERT) tablet 25 mg (25 mg Oral Given 3/23/24 1951)   iohexol (OMNIPAQUE) 350 MG/ML injection (MULTI-DOSE) 85 mL (85 mL Intravenous Given 3/23/24 2100)       Diagnostic Studies  Results Reviewed       Procedure Component Value Units Date/Time    HS Troponin I 2hr [[id number]]  (Normal) Collected: 03/23/24 2203    Lab Status: Final result Specimen: Blood from Arm, Left Updated: 03/23/24 2237     hs TnI 2hr 11 ng/L      Delta 2hr hsTnI 4 ng/L     FLU/RSV/COVID - if FLU/RSV clinically relevant [[id number]]  (Normal) Collected: 03/23/24 1955    Lab Status: Final result Specimen: Nares from Nose Updated: 03/23/24 2044     SARS-CoV-2 Negative     INFLUENZA A PCR Negative     INFLUENZA B PCR Negative     RSV PCR Negative    Narrative:      FOR PEDIATRIC PATIENTS - copy/paste COVID Guidelines URL to browser: [email or web address]    SARS-CoV-2 assay is a Nucleic Acid Amplification assay intended for the  qualitative detection of nucleic acid from SARS-CoV-2 in nasopharyngeal  swabs. Results are for the presumptive identification of SARS-CoV-2 RNA.    Positive results are indicative of infection with SARS-CoV-2, the virus  causing COVID-19, but do not rule out bacterial infection or co-infection  with other viruses. Laboratories within the [place] and its  territories are required to report all positive results to the appropriate  public health authorities. Negative results do not preclude SARS-CoV-2  infection and should not be used as the sole basis for treatment or other  patient management decisions. Negative results must be combined with  clinical observations, patient history, and  epidemiological information.  This test has not been FDA cleared or approved.    This test has been authorized by FDA under an Emergency Use Authorization  (EUA). This test is only authorized for the duration of time the  declaration that circumstances exist justifying the authorization of the  emergency use of an in vitro diagnostic tests for detection of SARS-CoV-2  virus and/or diagnosis of COVID-19 infection under section 564(b)(1) of  the Act, 21 U.S.C. 360bbb-3(b)(1), unless the authorization is terminated  or revoked sooner. The test has been validated but independent review by FDA  and CLIA is pending.    Test performed using [employer] GeneXpert: This RT-PCR assay targets N2,  a region unique to SARS-CoV-2. A conserved region in the E-gene was chosen  for pan-Sarbecovirus detection which includes SARS-CoV-2.    According to CMS-2020-01-R, this platform meets the definition of high-throughput technology.    HS Troponin I 4hr [[id number]]     Lab Status: No result Specimen: Blood     HS Troponin 0hr (reflex protocol) [[id number]]  (Normal) Collected: 03/23/24 1955    Lab Status: Final result Specimen: Blood from Arm, Left Updated: 03/23/24 2024     hs TnI 0hr 7 ng/L     Comprehensive metabolic panel [[id number]]  (Abnormal) Collected: 03/23/24 1955    Lab Status: Final result Specimen: Blood from Arm, Left Updated: 03/23/24 2018     Sodium 140 mmol/L      Potassium 3.4 mmol/L      Chloride 104 mmol/L      CO2 29 mmol/L      ANION GAP 7 mmol/L      BUN 11 mg/dL      Creatinine 1.03 mg/dL      Glucose 77 mg/dL      Calcium 9.4 mg/dL      AST 24 U/L      ALT 32 U/L      Alkaline Phosphatase 56 U/L      Total Protein 7.0 g/dL      Albumin 4.2 g/dL      Total Bilirubin 1.36 mg/dL      eGFR 81 ml/min/1.73sq m     Narrative:      National Kidney Disease Foundation guidelines for Chronic Kidney Disease (CKD):     Stage 1 with normal or high GFR (GFR > 90 mL/min/1.73 square meters)    Stage 2 Mild CKD (GFR = 60-89  mL/min/1.73 square meters)    Stage 3A Moderate CKD (GFR = 45-59 mL/min/1.73 square meters)    Stage 3B Moderate CKD (GFR = 30-44 mL/min/1.73 square meters)    Stage 4 Severe CKD (GFR = 15-29 mL/min/1.73 square meters)    Stage 5 End Stage CKD (GFR <15 mL/min/1.73 square meters)  Note: GFR calculation is accurate only with a steady state creatinine    Protime-INR [[id number]]  (Normal) Collected: 03/23/24 1955    Lab Status: Final result Specimen: Blood from Arm, Left Updated: 03/23/24 2013     Protime 13.4 seconds      INR 1.03    APTT [[id number]]  (Normal) Collected: 03/23/24 1955    Lab Status: Final result Specimen: Blood from Arm, Left Updated: 03/23/24 2013     PTT 27 seconds     CBC and differential [[id number]] Collected: 03/23/24 1955    Lab Status: Final result Specimen: Blood from Arm, Left Updated: 03/23/24 2003     WBC 9.40 Thousand/uL      RBC 4.96 Million/uL      Hemoglobin 14.5 g/dL      Hematocrit 45.0 %      MCV 91 fL      MCH 29.2 pg      MCHC 32.2 g/dL      RDW 13.0 %      MPV 8.9 fL      Platelets 226 Thousands/uL      nRBC 0 /100 WBCs      Neutrophils Relative 55 %      Immature Grans % 0 %      Lymphocytes Relative 34 %      Monocytes Relative 9 %      Eosinophils Relative 1 %      Basophils Relative 1 %      Neutrophils Absolute 5.24 Thousands/µL      Absolute Immature Grans 0.02 Thousand/uL      Absolute Lymphocytes 3.17 Thousands/µL      Absolute Monocytes 0.84 Thousand/µL      Eosinophils Absolute 0.08 Thousand/µL      Basophils Absolute 0.05 Thousands/µL                    CTA head and neck with and without contrast   Final Result by Richie Brown MD (03/23 2223)   Addendum (preliminary) 1 of 1 by Richie Brown MD (03/23 2223)   ADDENDUM:      Findings in the left V4 segment vertebral artery is more likely to    represent an incidental vascular anomaly such as a web or fenestration and    less likely to reflect a dissection.      Final      1.  No acute intracranial hemorrhage, mass effect  or extra-axial collection.      2. Focal filling defect in the left V4 vertebral artery segment concerning for either a short segment arterial dissection or vascular web.      3.  No intracranial aneurysm.  No hemodynamically significant stenosis or occlusion of the major vessels of the [place].      Findings discussed with Dr. Frazier at 9:30 p.m., 3/23/24            Workstation performed: [id number]                    Procedures  Procedures         ED Course  ED Course as of 03/24/24 0209   Sat Mar 23, 2024   2134 Received call from the radiology reading room.  Spoke with the reading radiologist on the CT head and neck reports a possible V4 segment dissection on the left.  Awaiting formal report to review.  Potentially explains the patient's posterior/occipital paresthesia + dizziness.  Will discuss with stroke neurology for further management recommendations.   2146 TT message sent to Stroke Neurology on Call Dr Azul for recs.    2223 Stroke neurologist feels that the CTA finding may represent artifact.  I discussed the findings and the equivocal interpretation amongst radiology and neurology with the patient and family.  I reassessed the symptoms he reports some improvement with meclizine and fluids but indicates that his dizziness does persist especially when he sits up.  I messaged neurology for help with disposition as I am concerned about sending patient home with persistent symptoms and abnormal CTA   Sun Mar 24, 2024   0205 CTA report was added.  The patient discussed the next steps with his wife and after discussion and upon reassessment they were comfortable with the plan to go home.  I discussed the plan to proceed with meclizine and I did offer them admission to the hospital/further workup which may include transfer, MRI which is complicated by the lack of MRI capabilities or inpatient neurology at this facility over the weekend.  I was indicated that neurology was recommending the  patient not proceed with further workup and should be discharged as their suspicion is that this represents artifact and that the patient's symptoms do not correlate with that CT finding if it were to be real.  Ultimately we agreed to discharge patient home on meclizine with outpatient follow-up but he will return here on Monday if his symptoms have not improved or develop new features or he reconsiders at which point he can proceed with admission and/or MRI evaluation.  I discussed this plan with the patient and his wife they were both in agreement at the time of discharge.  Return precautions were provided.                               SBIRT 22yo+      Flowsheet Row Most Recent Value   Initial Alcohol Screen: US AUDIT-C     1. How often do you have a drink containing alcohol? 0 Filed at: 03/23/2024 1900   2. How many drinks containing alcohol do you have on a typical day you are drinking?  0 Filed at: 03/23/2024 1900   3a. Male UNDER 65: How often do you have five or more drinks on one occasion? 0 Filed at: 03/23/2024 1900   Audit-C Score 0 Filed at: 03/23/2024 1900   CADENCE: How many times in the past year have you...    Used an illegal drug or used a prescription medication for non-medical reasons? Never Filed at: 03/23/2024 1900                      Medical Decision Making  54-year-old male who presents to the ER with a complaint of vertigo.  The exam demonstrated right beating nystagmus.  The [place]-Hallpike was equivocal based on my assessment but I am not an expert examiner with that test.  The patient was put in for CTA head and neck due to dizziness plus the complaint of paresthesias in the occipital region.  The CTA head and neck was abnormal noting a possible V4 segment dissection versus artifact versus anatomic variant.  The case was discussed with stroke neurology who then discussed with the radiologist and they both agreed that this represents a normal anatomic variant and not a section.  They amended the  report.  They do not totally rule out the possibility of dissection but neurologist advised against further workup or inpatient evaluation or MRI or transfer at this time.    The patient was updated with all these findings he did note some improvement in his symptoms with meclizine.  He originally wanted to go home.  With the initial concern for abnormal CT scan his wife convinced him to stay but then with the over read/edited read demonstrating this is likely an anatomic variant and the neurologist not feeling confident that the patient's symptoms correlated I discussed again with them the plan and they ultimately elected to go home and will try meclizine but will return here if symptoms do not improve or they reconsider.    Problems Addressed:  Abnormal computed tomography angiography (CTA) of neck: acute illness or injury  Vertigo: acute illness or injury    Amount and/or Complexity of Data Reviewed  Labs: ordered.  Radiology: ordered.    Risk  Prescription drug management.             Disposition  Final diagnoses:   Abnormal computed tomography angiography (CTA) of neck   Vertigo     Time reflects when diagnosis was documented in both MDM as applicable and the Disposition within this note       Time User Action Codes Description Comment    3/24/2024  1:24 AM Priyank Frazier Add [R93.89] Abnormal computed tomography angiography (CTA) of neck     3/24/2024  1:25 AM Priyank Frazier Add [R42] Vertigo           ED Disposition       ED Disposition   Discharge    Condition   Stable    Date/Time   Sun Mar 24, 2024 0124    Comment   Jeffrey Vann discharge to home/self care.                   Follow-up Information       Follow up With Specialties Details Why Contact Info Additional Information    [facility] Schedule an appointment as soon as possible for a visit   [address] [place] [address] [phone] [place] [address]  [address], [address] [phone]    Zoila Wells PA-C Family Medicine, Physician Assistant Schedule an appointment as soon as possible for a visit   [address] [facility] [address]  [phone]       [facility] Emergency Department Emergency Medicine Go to  If symptoms worsen [address] [facility] [id number] [phone] [facility] Emergency Department, [address], [place], [address]            Discharge Medication List as of 3/24/2024  1:28 AM        START taking these medications    Details   meclizine (ANTIVERT) 25 mg tablet Take 1 tablet (25 mg total) by mouth 3 (three) times a day as needed for dizziness, Starting Sun 3/24/2024, Normal           CONTINUE these medications which have NOT CHANGED    Details   cyclobenzaprine (FLEXERIL) 5 mg tablet Take 1 tablet (5 mg total) by mouth 3 (three) times a day as needed for muscle spasms, Starting Wed 11/1/2023, Normal                 PDMP Review       None            ED Provider  Electronically Signed by             Priyank Frazier, DO  03/24/24 3201

## 2024-03-24 NOTE — DISCHARGE INSTRUCTIONS
CTA head and neck with and without contrast   Final Result   Addendum (preliminary) 1 of 1   ADDENDUM:      Findings in the left V4 segment vertebral artery is more likely to    represent an incidental vascular anomaly such as a web or fenestration and    less likely to reflect a dissection.      Final      1.  No acute intracranial hemorrhage, mass effect or extra-axial collection.      2. Focal filling defect in the left V4 vertebral artery segment concerning for either a short segment arterial dissection or vascular web.      3.  No intracranial aneurysm.  No hemodynamically significant stenosis or occlusion of the major vessels of the [place].      Findings discussed with Dr. Frazier at 9:30 p.m., 3/23/24            Workstation performed: [id number]           Take meclizine medicine as prescribed.  Contact the neurologist office for follow-up visit  Return here if symptoms worsen or fail to improve for further evaluation.    You were found to have abnormal CT scan of the neck with a narrowing noted in a segment of the left vertebral artery.  This was discussed between the radiologist and the stroke neurologist and they both agree that this represents an artifact/normal variant and does not likely represent a true tear or dissection of the artery.    We think that you are having inner ear dysfunction as the cause of your vertigo.  We are prescribing a medicine called meclizine to help with your symptoms.  If your symptoms do not improve or worsen or you develop new features or you reconsider and would like further evaluation you should return to the emergency department for further workup and possible admission and possible MRI.    Please note that we do not have MRI capability at this facility over the weekend.  Furthermore, the neurologist does not feel that you need to be admitted or undergo an MRI at this time as she feels that the finding is a normal variant and not a dissection/tear and the  original radiologist who read your report agrees with that assessment.

## 2024-03-25 ENCOUNTER — HOSPITAL ENCOUNTER (OUTPATIENT)
Facility: HOSPITAL | Age: 54
Setting detail: OBSERVATION
Discharge: HOME | End: 2024-03-26
Attending: EMERGENCY MEDICINE | Admitting: INTERNAL MEDICINE
Payer: COMMERCIAL

## 2024-03-25 ENCOUNTER — APPOINTMENT (EMERGENCY)
Dept: RADIOLOGY | Facility: HOSPITAL | Age: 54
End: 2024-03-25
Payer: COMMERCIAL

## 2024-03-25 DIAGNOSIS — R93.89 ABNORMAL COMPUTED TOMOGRAPHY ANGIOGRAPHY (CTA) OF NECK: ICD-10-CM

## 2024-03-25 DIAGNOSIS — R42 DIZZINESS: Primary | ICD-10-CM

## 2024-03-25 DIAGNOSIS — R42 VERTIGO: ICD-10-CM

## 2024-03-25 LAB
2HR DELTA HS TROPONIN: 0 NG/L
4HR DELTA HS TROPONIN: -1 NG/L
ALBUMIN SERPL BCP-MCNC: 4 G/DL (ref 3.5–5)
ALP SERPL-CCNC: 51 U/L (ref 34–104)
ALT SERPL W P-5'-P-CCNC: 24 U/L (ref 7–52)
ANION GAP SERPL CALCULATED.3IONS-SCNC: 9 MMOL/L (ref 4–13)
APTT PPP: 28 SECONDS (ref 23–37)
AST SERPL W P-5'-P-CCNC: 20 U/L (ref 13–39)
ATRIAL RATE: 60 BPM
BACTERIA UR QL AUTO: NORMAL /HPF
BASOPHILS # BLD AUTO: 0.05 THOUSANDS/ÂΜL (ref 0–0.1)
BASOPHILS NFR BLD AUTO: 1 % (ref 0–1)
BILIRUB SERPL-MCNC: 1.26 MG/DL (ref 0.2–1)
BILIRUB UR QL STRIP: NEGATIVE
BUN SERPL-MCNC: 10 MG/DL (ref 5–25)
CALCIUM SERPL-MCNC: 9.2 MG/DL (ref 8.4–10.2)
CARDIAC TROPONIN I PNL SERPL HS: 3 NG/L
CARDIAC TROPONIN I PNL SERPL HS: 4 NG/L
CARDIAC TROPONIN I PNL SERPL HS: 4 NG/L
CHLORIDE SERPL-SCNC: 107 MMOL/L (ref 96–108)
CLARITY UR: CLEAR
CO2 SERPL-SCNC: 28 MMOL/L (ref 21–32)
COLOR UR: YELLOW
CREAT SERPL-MCNC: 1 MG/DL (ref 0.6–1.3)
EOSINOPHIL # BLD AUTO: 0.14 THOUSAND/ÂΜL (ref 0–0.61)
EOSINOPHIL NFR BLD AUTO: 3 % (ref 0–6)
ERYTHROCYTE [DISTWIDTH] IN BLOOD BY AUTOMATED COUNT: 12.9 % (ref 11.6–15.1)
GFR SERPL CREATININE-BSD FRML MDRD: 84 ML/MIN/1.73SQ M
GLUCOSE SERPL-MCNC: 77 MG/DL (ref 65–140)
GLUCOSE UR STRIP-MCNC: NEGATIVE MG/DL
HCT VFR BLD AUTO: 44.5 % (ref 36.5–49.3)
HGB BLD-MCNC: 14.1 G/DL (ref 12–17)
HGB UR QL STRIP.AUTO: ABNORMAL
IMM GRANULOCYTES # BLD AUTO: 0.01 THOUSAND/UL (ref 0–0.2)
IMM GRANULOCYTES NFR BLD AUTO: 0 % (ref 0–2)
INR PPP: 1 (ref 0.84–1.19)
KETONES UR STRIP-MCNC: NEGATIVE MG/DL
LEUKOCYTE ESTERASE UR QL STRIP: NEGATIVE
LYMPHOCYTES # BLD AUTO: 2.38 THOUSANDS/ÂΜL (ref 0.6–4.47)
LYMPHOCYTES NFR BLD AUTO: 44 % (ref 14–44)
MAGNESIUM SERPL-MCNC: 1.9 MG/DL (ref 1.9–2.7)
MCH RBC QN AUTO: 29 PG (ref 26.8–34.3)
MCHC RBC AUTO-ENTMCNC: 31.7 G/DL (ref 31.4–37.4)
MCV RBC AUTO: 91 FL (ref 82–98)
MONOCYTES # BLD AUTO: 0.65 THOUSAND/ÂΜL (ref 0.17–1.22)
MONOCYTES NFR BLD AUTO: 12 % (ref 4–12)
NEUTROPHILS # BLD AUTO: 2.19 THOUSANDS/ÂΜL (ref 1.85–7.62)
NEUTS SEG NFR BLD AUTO: 40 % (ref 43–75)
NITRITE UR QL STRIP: NEGATIVE
NON-SQ EPI CELLS URNS QL MICRO: NORMAL /HPF
NRBC BLD AUTO-RTO: 0 /100 WBCS
P AXIS: 60 DEGREES
PH UR STRIP.AUTO: 6.5 [PH]
PLATELET # BLD AUTO: 226 THOUSANDS/UL (ref 149–390)
PMV BLD AUTO: 9.2 FL (ref 8.9–12.7)
POTASSIUM SERPL-SCNC: 4 MMOL/L (ref 3.5–5.3)
PR INTERVAL: 148 MS
PROT SERPL-MCNC: 6.8 G/DL (ref 6.4–8.4)
PROT UR STRIP-MCNC: NEGATIVE MG/DL
PROTHROMBIN TIME: 13.1 SECONDS (ref 11.6–14.5)
QRS AXIS: 96 DEGREES
QRSD INTERVAL: 100 MS
QT INTERVAL: 400 MS
QTC INTERVAL: 400 MS
RBC # BLD AUTO: 4.87 MILLION/UL (ref 3.88–5.62)
RBC #/AREA URNS AUTO: NORMAL /HPF
SODIUM SERPL-SCNC: 144 MMOL/L (ref 135–147)
SP GR UR STRIP.AUTO: 1.02 (ref 1–1.03)
T WAVE AXIS: 21 DEGREES
UROBILINOGEN UR QL STRIP.AUTO: 0.2 E.U./DL
VENTRICULAR RATE: 60 BPM
WBC # BLD AUTO: 5.42 THOUSAND/UL (ref 4.31–10.16)
WBC #/AREA URNS AUTO: NORMAL /HPF

## 2024-03-25 PROCEDURE — 84484 ASSAY OF TROPONIN QUANT: CPT | Performed by: PHYSICIAN ASSISTANT

## 2024-03-25 PROCEDURE — 93005 ELECTROCARDIOGRAM TRACING: CPT

## 2024-03-25 PROCEDURE — 99222 1ST HOSP IP/OBS MODERATE 55: CPT | Performed by: INTERNAL MEDICINE

## 2024-03-25 PROCEDURE — 99284 EMERGENCY DEPT VISIT MOD MDM: CPT

## 2024-03-25 PROCEDURE — 80053 COMPREHEN METABOLIC PANEL: CPT | Performed by: PHYSICIAN ASSISTANT

## 2024-03-25 PROCEDURE — 71045 X-RAY EXAM CHEST 1 VIEW: CPT

## 2024-03-25 PROCEDURE — 99245 OFF/OP CONSLTJ NEW/EST HI 55: CPT | Performed by: STUDENT IN AN ORGANIZED HEALTH CARE EDUCATION/TRAINING PROGRAM

## 2024-03-25 PROCEDURE — 85730 THROMBOPLASTIN TIME PARTIAL: CPT | Performed by: PHYSICIAN ASSISTANT

## 2024-03-25 PROCEDURE — 85610 PROTHROMBIN TIME: CPT | Performed by: PHYSICIAN ASSISTANT

## 2024-03-25 PROCEDURE — 85025 COMPLETE CBC W/AUTO DIFF WBC: CPT | Performed by: PHYSICIAN ASSISTANT

## 2024-03-25 PROCEDURE — 36415 COLL VENOUS BLD VENIPUNCTURE: CPT | Performed by: PHYSICIAN ASSISTANT

## 2024-03-25 PROCEDURE — 99285 EMERGENCY DEPT VISIT HI MDM: CPT | Performed by: PHYSICIAN ASSISTANT

## 2024-03-25 PROCEDURE — 93010 ELECTROCARDIOGRAM REPORT: CPT | Performed by: INTERNAL MEDICINE

## 2024-03-25 PROCEDURE — 81001 URINALYSIS AUTO W/SCOPE: CPT | Performed by: PHYSICIAN ASSISTANT

## 2024-03-25 PROCEDURE — 83735 ASSAY OF MAGNESIUM: CPT | Performed by: PHYSICIAN ASSISTANT

## 2024-03-25 RX ORDER — ASPIRIN 81 MG/1
81 TABLET, CHEWABLE ORAL DAILY
Status: DISCONTINUED | OUTPATIENT
Start: 2024-03-25 | End: 2024-03-26 | Stop reason: HOSPADM

## 2024-03-25 RX ORDER — ENOXAPARIN SODIUM 100 MG/ML
40 INJECTION SUBCUTANEOUS DAILY
Status: DISCONTINUED | OUTPATIENT
Start: 2024-03-25 | End: 2024-03-26 | Stop reason: HOSPADM

## 2024-03-25 RX ORDER — MECLIZINE HYDROCHLORIDE 25 MG/1
25 TABLET ORAL EVERY 8 HOURS PRN
Status: DISCONTINUED | OUTPATIENT
Start: 2024-03-25 | End: 2024-03-26 | Stop reason: HOSPADM

## 2024-03-25 RX ORDER — ATORVASTATIN CALCIUM 40 MG/1
40 TABLET, FILM COATED ORAL EVERY EVENING
Status: DISCONTINUED | OUTPATIENT
Start: 2024-03-26 | End: 2024-03-26 | Stop reason: HOSPADM

## 2024-03-25 RX ADMIN — ASPIRIN 81 MG 81 MG: 81 TABLET ORAL at 17:48

## 2024-03-25 RX ADMIN — ENOXAPARIN SODIUM 40 MG: 40 INJECTION SUBCUTANEOUS at 17:48

## 2024-03-25 RX ADMIN — MECLIZINE HYDROCHLORIDE 25 MG: 25 TABLET ORAL at 18:03

## 2024-03-25 NOTE — ASSESSMENT & PLAN NOTE
Presents with episodic vertigo since Thursday worsened by head movements, position changes, ambulation. Resolves when laying on left side. No other neuro sxs, no focal deficits. Reports sinus infection a few weeks prior. Was seen for same 3/23 and returns given persistent symptoms & no improvement with meclizine.  CTA H/N: no acute intracranial abnormality, focal filling defect in left V4 vertebral arery segment. No LVO or significant stenosis   Finding reviewed with neuro on 3/23 by ED provider, felt to be artifact   Orthostatics negative  R/o posterior CVA. Likely BPPV, possible vestibular neuritis?  Admit on stroke pathway with frequent VS, neuro checks   MRI brain ordered   Start aspirin/statin for now   Check A1c/lipid panel. TSH wnl  Neurology consult appreciated  PT/OT

## 2024-03-25 NOTE — ASSESSMENT & PLAN NOTE
Assessment:  54 y.o. male with past medical history pertinent for asthma, PTSD, low back pain who presents to [facility] ED with 4 days of dizziness.  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  Given reported triggers of head movement and orthostasis, there may be contributions from both a peripheral vestibulopathy and a cerebral hypoperfusional state (i.e. orthostatic hypotension).  Exam finding of rightward horizontal nystagmus certainly points towards a peripheral vestibulopathy.  Nothing else on exam to suggest a central etiology, however, his symptoms have been rather persistent and he does have the CTA finding of questionable left V4 filling defect, so reasonable to obtain MRI to definitively rule out central etiology.    Plan:  -MRI brain without contrast  -Orthostatic vital signs  -If MRI negative, no further inpatient neurologic workup or treatment indicated.  In that case would recommend inpatient versus outpatient ENT evaluation for peripheral vestibulopathy

## 2024-03-25 NOTE — H&P
"[facility]  H&P  Name: Jeffrey Vann 54 y.o. male I MRN: [id number]  Unit/Bed#: 414-01 I Date of Admission: 3/25/2024   Date of Service: 3/25/2024 I Hospital Day: 0      Assessment/Plan   * Vertigo  Assessment & Plan  Presents with episodic vertigo since Thursday worsened by head movements, position changes, ambulation. Resolves when laying on left side. No other neuro sxs, no focal deficits. Reports sinus infection a few weeks prior. Was seen for same 3/23 and returns given persistent symptoms & no improvement with meclizine.  CTA H/N: no acute intracranial abnormality, focal filling defect in left V4 vertebral arery segment. No LVO or significant stenosis   Finding reviewed with neuro on 3/23 by ED provider, felt to be artifact   Orthostatics negative  R/o posterior CVA. Likely BPPV, possible vestibular neuritis?  Admit on stroke pathway with frequent VS, neuro checks   MRI brain ordered   Start aspirin/statin for now   Check A1c/lipid panel. TSH wnl  Neurology consult appreciated  PT/OT           VTE Pharmacologic Prophylaxis:   lovenox  Code Status: Level 1 - Full Code   Discussion with family: Patient declined call to .     Anticipated Length of Stay: Patient will be admitted on an observation basis with an anticipated length of stay of less than 2 midnights secondary to vertigo.    Total Time Spent on Date of Encounter in care of patient:  mins. This time was spent on one or more of the following: performing physical exam; counseling and coordination of care; obtaining or reviewing history; documenting in the medical record; reviewing/ordering tests, medications or procedures; communicating with other healthcare professionals and discussing with patient's family/caregivers.    Chief Complaint: vertigo    History of Present Illness:  Jeffrey Vann is a 54 y.o. male who presents with episodic vertigo since Thursday. Reports sensation of \"room-spinning\" and \"feeling as if " "he is drunk\" when head movements, position changes, ambulation occurring since Thursday of last week. Does subside when he lays down on his left side. No tinnitus, hearing changes, facial droop, one-sided weakness, paresthesias, visual changes, dysarthria, diplopia, chest pain, SOB, abdominal pain, N/V/D, urinary complaints. Does note he had sinus congestion and a \"head cold\" a few weeks prior. He was seen for same I ED 3/23 and had CTA H/N with Focal filling defect in the left V4 vertebral artery segment concerning for either a short segment arterial dissection or vascular web. , neurology on call felt findings likely artifact. Was recommended for admission at that time for MRI but declined. He was given meclizine and instructed to return if symptoms persist.     Review of Systems:  Review of Systems   Constitutional:  Negative for activity change, appetite change and chills.   HENT:  Negative for congestion.    Respiratory:  Negative for shortness of breath.    Cardiovascular:  Negative for palpitations and leg swelling.   Gastrointestinal:  Negative for abdominal pain, diarrhea, nausea and vomiting.   Genitourinary:  Negative for difficulty urinating.   Musculoskeletal:  Negative for neck pain.   Neurological:  Positive for dizziness. Negative for tremors, syncope, facial asymmetry, speech difficulty, weakness, light-headedness, numbness and headaches.       Past Medical and Surgical History:   Past Medical History:   Diagnosis Date    Asthma     Disease of thyroid gland     PTSD (post-traumatic stress disorder)        Past Surgical History:   Procedure Laterality Date    HERNIA REPAIR      2013    WRIST ARTHROPLASTY Right     2015       Meds/Allergies:  Prior to Admission medications    Medication Sig Start Date End Date Taking? Authorizing Provider   cyclobenzaprine (FLEXERIL) 5 mg tablet Take 1 tablet (5 mg total) by mouth 3 (three) times a day as needed for muscle spasms 11/1/23   Zoila Wells PA-C "   meclizine (ANTIVERT) 25 mg tablet Take 1 tablet (25 mg total) by mouth 3 (three) times a day as needed for dizziness 3/24/24   Priyank Frazier, DO     I have reviewed home medications with patient personally.    Allergies: No Known Allergies    Social History:  Marital Status: /Civil Union   Occupation:   Patient Pre-hospital Living Situation: Home  Patient Pre-hospital Level of Mobility:   Patient Pre-hospital Diet Restrictions:   Substance Use History:   Social History     Substance and Sexual Activity   Alcohol Use Not Currently     Social History     Tobacco Use   Smoking Status Some Days    Types: Cigarettes   Smokeless Tobacco Never     Social History     Substance and Sexual Activity   Drug Use Never       Family History:  Family History   Problem Relation Age of Onset    Diabetes Father     Asthma Father        Physical Exam:     Vitals:   Blood Pressure: 112/59 (03/25/24 1500)  Pulse: 63 (03/25/24 1500)  Temperature: (!) 97 °F (36.1 °C) (03/25/24 1500)  Temp Source: Temporal (03/25/24 1255)  Respirations: 18 (03/25/24 1500)  Height: 6' (182.9 cm) (03/25/24 1507)  Weight - Scale: 87.5 kg (193 lb) (03/25/24 1507)  SpO2: 99 % (03/25/24 1500)    Physical Exam  HENT:      Head: Normocephalic and atraumatic.   Eyes:      General: No visual field deficit.     Extraocular Movements: Extraocular movements intact.      Pupils: Pupils are equal, round, and reactive to light.      Comments: Positive horizontal nystagmus to right eye with EOM   Cardiovascular:      Rate and Rhythm: Normal rate and regular rhythm.   Pulmonary:      Effort: Pulmonary effort is normal. No respiratory distress.      Breath sounds: Normal breath sounds. No wheezing.   Abdominal:      General: Abdomen is flat. Bowel sounds are normal. There is no distension.      Palpations: Abdomen is soft.   Musculoskeletal:      Right lower leg: No edema.      Left lower leg: No edema.   Skin:     General: Skin is warm and dry.    Neurological:      General: No focal deficit present.      Mental Status: He is alert and oriented to person, place, and time.      GCS: GCS eye subscore is 4. GCS verbal subscore is 5. GCS motor subscore is 6.      Cranial Nerves: Cranial nerves 2-12 are intact. No cranial nerve deficit, dysarthria or facial asymmetry.      Coordination: Finger-Nose-Finger Test and Heel to Shin Test normal.          Additional Data:     Lab Results:  Results from last 7 days   Lab Units 03/25/24  1349   WBC Thousand/uL 5.42   HEMOGLOBIN g/dL 14.1   HEMATOCRIT % 44.5   PLATELETS Thousands/uL 226   NEUTROS PCT % 40*   LYMPHS PCT % 44   MONOS PCT % 12   EOS PCT % 3     Results from last 7 days   Lab Units 03/25/24  1349   SODIUM mmol/L 144   POTASSIUM mmol/L 4.0   CHLORIDE mmol/L 107   CO2 mmol/L 28   BUN mg/dL 10   CREATININE mg/dL 1.00   ANION GAP mmol/L 9   CALCIUM mg/dL 9.2   ALBUMIN g/dL 4.0   TOTAL BILIRUBIN mg/dL 1.26*   ALK PHOS U/L 51   ALT U/L 24   AST U/L 20   GLUCOSE RANDOM mg/dL 77     Results from last 7 days   Lab Units 03/25/24  1349   INR  1.00                   Lines/Drains:  Invasive Devices       Peripheral Intravenous Line  Duration             Peripheral IV 03/25/24 Left Antecubital <1 day                        Imaging: Reviewed radiology reports from this admission including: CTA H/N  XR chest 1 view portable    (Results Pending)   MRI Inpatient Order    (Results Pending)       EKG and Other Studies Reviewed on Admission:   EKG: NSR. HR 73.Qtc 405    ** Please Note: This note has been constructed using a voice recognition system. **

## 2024-03-25 NOTE — CONSULTS
TeleConsultation - Neurology   Jeffrey Vann 54 y.o. male MRN: [id number]  Unit/Bed#: 414-01 Encounter: [id number]        REQUIRED DOCUMENTATION:     1. This service was provided via Telemedicine.  2. Provider located at [facility].  3. TeleMed provider: Chase Peña MD.  4. Identify all parties in room with patient during tele consult:  none  5.Patient was then informed that this was a Telemedicine visit and that the exam was being conducted confidentially over secure lines. My office door was closed. No one else was in the room.  Patient acknowledged consent and understanding of privacy and security of the Telemedicine visit, and gave us permission to have the assistant stay in the room in order to assist with the history and to conduct the exam.  I informed the patient that I have reviewed their record in Epic and presented the opportunity for them to ask any questions regarding the visit today.  The patient agreed to participate.             Assessment/Plan     * Vertigo  Assessment & Plan  Assessment:  54 y.o. male with past medical history pertinent for asthma, PTSD, low back pain who presents to [facility] ED with 4 days of dizziness.  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  Given reported triggers of head movement and orthostasis, there may be contributions from both a peripheral vestibulopathy and a cerebral hypoperfusional state (i.e. orthostatic hypotension).  Exam finding of rightward horizontal nystagmus certainly points towards a peripheral vestibulopathy.  Nothing else on exam to suggest a central etiology, however, his symptoms have been rather persistent and he does have the CTA finding of questionable left V4 filling defect, so reasonable to obtain MRI to definitively rule out central etiology.    Plan:  -MRI brain without contrast  -Orthostatic vital signs  -If MRI negative, no further inpatient neurologic workup or treatment indicated.  In  that case would recommend inpatient versus outpatient ENT evaluation for peripheral vestibulopathy          Jeffrey Vann will not need outpatient follow up with Neurology. He will not require outpatient neurological testing.    History of Present Illness     Reason for Consult / Principal Problem: Dizziness  Hx and PE limited by: N/A  HPI: Jeffrey Vann is a 54 y.o. male with past medical history pertinent for asthma, PTSD, low back pain who presents to [facility] ED with dizziness.    Initially developed room spinning dizziness on/21/24.  Triggered by head/body movement and resolves with prolonged rest.  Presented to [facility] ED on 3/23/2024 for evaluation of the symptoms.  ED exam was notable for right beating horizontal fatigable nystagmus with extraocular movement testing as well as delayed onset of right word nystagmus nystagmus with Capitola-Hallpike testing.  Exam otherwise nonfocal.  Vital signs stable.  Lab workup unremarkable.  He had a CTA head/neck which was notable for a focal filling defect in the left V4 concerning for dissection or vascular web.  The case was discussed with on-call neurology who felt CTA findings were more likely artifactual.  There was a discussion regarding admission for MRI, but ultimately because there was no MRI availability over the weekend the patient was discharged home with instructions to return to the ED on Monday if symptoms had not improved.  He returned to the ED today with persistent symptoms.  Reports a head cold several weeks before onset of symptoms.  Slim exam reportedly notable for rightward horizontal nystagmus.  Vital signs stable.  Labs unremarkable.    Patient confirmed the above history.  Symptoms began shortly after getting up from bed on Thursday morning.  Describes symptoms of lightheadedness, room spinning sensation, stumbling gait, felt like he was drunk.  Since onset of symptoms, he has had recurrent episodes triggered by change in head or body movement,  especially when going from lying to seated or seated to standing.  Can also trigger the movements by moving his head back-and-forth while seated.  While he is completely static, the symptoms completely resolve.  During the past few days he denies any headache, vision change, double vision, speech change, numbness or tingling, focal weakness.  Initially his gait was unstable without any clear directional preference, but more recently he feels like he is falling more consistently towards the left.  No history of similar symptoms, though he does report that occasionally if he gets out of bed too quickly he will get slightly lightheaded but that resolves in a matter of seconds.  Does report a head cold at the end of February with stuffy nose, head congestion/pressure/pain.  He took a few days of Mucinex and Sudafed and symptoms resolved.  He has had no further URI symptoms over the past few weeks and has not taken any of these cold medications in the past few weeks.    Reports past medical history of sciatica for which she used to be on a muscle relaxer.  Denies any history of HTN, HLD, DM, stroke, TIA, MI, CAD, A-fib.  Smokes about 1/3 pack/day.  Very rare alcohol use.  No other drug use.    Inpatient consult to Neurology  Consult performed by: Chase Peña MD  Consult ordered by: Isabella Rocha PA-C           Review of Systems: Negative or noncontributory unless otherwise stated above    Historical Information   Past Medical History:   Diagnosis Date    Asthma     Disease of thyroid gland     PTSD (post-traumatic stress disorder)      Past Surgical History:   Procedure Laterality Date    HERNIA REPAIR      2013    WRIST ARTHROPLASTY Right     2015     Social History   Social History     Substance and Sexual Activity   Alcohol Use Not Currently     Social History     Substance and Sexual Activity   Drug Use Never     E-Cigarette/Vaping    E-Cigarette Use Never User      E-Cigarette/Vaping Substances     Nicotine No     THC No     CBD No     Flavoring No     Other No     Unknown No      Social History     Tobacco Use   Smoking Status Some Days    Types: Cigarettes   Smokeless Tobacco Never     Family History:   Family History   Problem Relation Age of Onset    Diabetes Father     Asthma Father        Review of previous medical records was completed.  3/23/24 ED notes.    Meds/Allergies   all current active meds have been reviewed, current meds:   Current Facility-Administered Medications   Medication Dose Route Frequency    aspirin chewable tablet 81 mg  81 mg Oral Daily    [START ON 3/26/2024] atorvastatin (LIPITOR) tablet 40 mg  40 mg Oral QPM    enoxaparin (LOVENOX) subcutaneous injection 40 mg  40 mg Subcutaneous Daily    meclizine (ANTIVERT) tablet 25 mg  25 mg Oral Q8H PRN   , and PTA meds:   Prior to Admission Medications   Prescriptions Last Dose Informant Patient Reported? Taking?   cyclobenzaprine (FLEXERIL) 5 mg tablet Past Month  No Yes   Sig: Take 1 tablet (5 mg total) by mouth 3 (three) times a day as needed for muscle spasms   meclizine (ANTIVERT) 25 mg tablet 3/25/2024  No Yes   Sig: Take 1 tablet (25 mg total) by mouth 3 (three) times a day as needed for dizziness      Facility-Administered Medications: None       No Known Allergies    Objective   Vitals:Blood pressure 112/59, pulse 63, temperature (!) 97 °F (36.1 °C), resp. rate 18, height 6' (1.829 m), weight 87.5 kg (193 lb), SpO2 99%.,Body mass index is 26.18 kg/m².  No intake or output data in the 24 hours ending 03/25/24 1623    Invasive Devices:   Invasive Devices       Peripheral Intravenous Line  Duration             Peripheral IV 03/25/24 Left Antecubital <1 day                  Neurologic Exam:  MENTAL STATUS: AAOx3. Follows complex commands. Affect normal w/ congruent mood.    LANGUAGE: Speech clear, spontaneous, and fluent. No aphasia -  naming, repetition, comprehension intact. No dysarthria - normal volume and intonation.    CRANIAL  NERVES:  CN II: Visual acuity grossly intact. No appreciable visual field deficit.   CN III, IV, VI: EOM's with full ductions.  Persistent rightward horizontal nystagmus with rightward gaze   CN V: Normal masseter bulk. V1-V3 sensation intact and symmetric bilaterally.   CN VII: Face movement symmetric. Smile, eyebrow raise, eyelid bury symmetric. Nasolabial folds and palpebral folds symmetric.   CN VIII: Hearing grossly intact bilaterally.   CN IX-X: No dysarthria.   CN XI: Shoulder shrug symmetric.   CN XII: Tongue midline. No deviation, atrophy, or fasciculations.    MOTOR: Normal muscle bulk. No pronator drift or orbiting. No tremors or abnormal involuntary movements appreciated.  Hold all extremities antigravity without drift.    SENSORY:  Light touch: Subjectively intact and symmetric throughout.    COORDINATION: Finger-to-nose w/ eyes closed intact bilaterally. Finger-to-nose w/ eyes open intact bilaterally w/o dysmetria or ataxia. Heel-to-shin intact bilaterally w/o ataxia. PATRICE (finger-tapping, open/close fist, toe-tapping) intact bilaterally. No dysdiadochokinesia. No truncal ataxia.    GAIT: Deferred.    Lab Results: I have personally reviewed pertinent reports.  , CBC:   Results from last 7 days   Lab Units 03/25/24  1349 03/23/24 1955   WBC Thousand/uL 5.42 9.40   RBC Million/uL 4.87 4.96   HEMOGLOBIN g/dL 14.1 14.5   HEMATOCRIT % 44.5 45.0   MCV fL 91 91   PLATELETS Thousands/uL 226 226   , BMP/CMP:   Results from last 7 days   Lab Units 03/25/24  1349 03/23/24 1955   SODIUM mmol/L 144 140   POTASSIUM mmol/L 4.0 3.4*   CHLORIDE mmol/L 107 104   CO2 mmol/L 28 29   BUN mg/dL 10 11   CREATININE mg/dL 1.00 1.03   CALCIUM mg/dL 9.2 9.4   AST U/L 20 24   ALT U/L 24 32   ALK PHOS U/L 51 56   EGFR ml/min/1.73sq m 84 81     Imaging Studies: I have personally reviewed pertinent reports.   and I have personally reviewed pertinent films in PACS  CTA NECK AND BRAIN WITH AND WITHOUT CONTRAST  3/23/2024  IMPRESSION:  1.  No acute intracranial hemorrhage, mass effect or extra-axial collection.     2. Focal filling defect in the left V4 vertebral artery segment concerning for either a short segment arterial dissection or vascular web.     3.  No intracranial aneurysm.  No hemodynamically significant stenosis or occlusion of the major vessels of the [place].    ADDENDUM:     Findings in the left V4 segment vertebral artery is more likely to represent an incidental vascular anomaly such as a web or fenestration and less likely to reflect a dissection.    EKG, Pathology, and Other Studies: I have personally reviewed pertinent reports.    VTE Prophylaxis: Enoxaparin (Lovenox)    Counseling / Coordination of Care  Total time spent today 60 minutes. Greater than 50% of total time was spent with the patient and / or family counseling and / or coordination of care. A description of the counseling / coordination of care: Summarization of clinical reasoning, further inpatient testing, prognosis, treatment strategies.

## 2024-03-25 NOTE — ED PROVIDER NOTES
History  Chief Complaint   Patient presents with    Dizziness     Pt states he is continuting to have dizziness starting on Friday. Pt states he was seen here on Saturday for same. Pt denies cp sob blurry vision     54 year old male with PMH asthma presenting ambulatory from home accompanied by spouse for evaluation of dizziness.  He indicates he was seen here recently for the same and told to come back for MRI.  He reports symptoms started last Thursday.  He describes dizziness which is reported to be spinning.  This is worse when he turns his head, stands up.  Feels improved by remaining still and laying down.  Denies headache.  Denies visual disturbance.  No noted facial droop.  No difficultly with speech.  Denies numbness, tingling, focal weakness.  Denies fever, chills, cough, congestion.  He notes he had a head cold at end of February but this has improved.  Denies chest pain, SOB, N/V/D, abdominal pain.  No back or flank pain.  He reports he feels unsteady and off balance while walking.  He describes this as feeling drunk. Spouse notes his urine has been dark and he has complained of some discomfort when urinating.    He was evaluated here on Saturday.  Records reviewed.  He tells me he wanted to go home at that time.  Was given meclizine which he does not feel makes a difference in his symptoms.  No prior history of vertigo.   No syncope or reported near syncope.  No recent falls.  No head injury/trauma.  He reports symptoms are unchanged from his prior visit.      History provided by:  Patient and medical records   used: No    Dizziness  Quality:  Imbalance and head spinning  Duration:  5 days  Timing:  Intermittent  Chronicity:  New  Relieved by:  Being still  Worsened by:  Turning head and standing up  Ineffective treatments:  Medication  Associated symptoms: no chest pain, no diarrhea, no headaches, no nausea, no palpitations, no shortness of breath, no syncope, no tinnitus, no vision  changes, no vomiting and no weakness    Risk factors: no anemia, no heart disease, no hx of stroke, no hx of vertigo, no multiple medications and no new medications        Prior to Admission Medications   Prescriptions Last Dose Informant Patient Reported? Taking?   cyclobenzaprine (FLEXERIL) 5 mg tablet Past Month  No Yes   Sig: Take 1 tablet (5 mg total) by mouth 3 (three) times a day as needed for muscle spasms   meclizine (ANTIVERT) 25 mg tablet 3/25/2024  No Yes   Sig: Take 1 tablet (25 mg total) by mouth 3 (three) times a day as needed for dizziness      Facility-Administered Medications: None       Past Medical History:   Diagnosis Date    Asthma     Disease of thyroid gland     PTSD (post-traumatic stress disorder)        Past Surgical History:   Procedure Laterality Date    HERNIA REPAIR      2013    WRIST ARTHROPLASTY Right     2015       Family History   Problem Relation Age of Onset    Diabetes Father     Asthma Father      I have reviewed and agree with the history as documented.    E-Cigarette/Vaping    E-Cigarette Use Never User      E-Cigarette/Vaping Substances    Nicotine No     THC No     CBD No     Flavoring No     Other No     Unknown No      Social History     Tobacco Use    Smoking status: Some Days     Types: Cigarettes    Smokeless tobacco: Never   Vaping Use    Vaping status: Never Used   Substance Use Topics    Alcohol use: Not Currently    Drug use: Never       Review of Systems   Constitutional: Negative.  Negative for chills, fatigue and fever.   HENT: Negative.  Negative for congestion, rhinorrhea, sore throat and tinnitus.    Eyes: Negative.  Negative for visual disturbance.   Respiratory: Negative.  Negative for cough, shortness of breath and wheezing.    Cardiovascular: Negative.  Negative for chest pain, palpitations, leg swelling and syncope.   Gastrointestinal: Negative.  Negative for abdominal pain, constipation, diarrhea, nausea and vomiting.   Genitourinary: Negative.   Negative for dysuria, flank pain, frequency and hematuria.   Musculoskeletal:  Positive for gait problem. Negative for back pain, myalgias and neck pain.   Skin: Negative.  Negative for rash.   Neurological:  Positive for dizziness. Negative for syncope, facial asymmetry, speech difficulty, weakness, light-headedness, numbness and headaches.   Psychiatric/Behavioral: Negative.  Negative for confusion.    All other systems reviewed and are negative.      Physical Exam  Physical Exam  Vitals and nursing note reviewed.   Constitutional:       General: He is awake. He is not in acute distress.     Appearance: Normal appearance. He is well-developed. He is not toxic-appearing or diaphoretic.   HENT:      Head: Normocephalic and atraumatic.      Right Ear: Hearing, tympanic membrane, ear canal and external ear normal.      Left Ear: Hearing, tympanic membrane, ear canal and external ear normal.      Nose: Nose normal.      Mouth/Throat:      Lips: Pink.      Mouth: Mucous membranes are moist.      Tongue: Tongue does not deviate from midline.      Pharynx: Oropharynx is clear. Uvula midline.   Eyes:      General: Lids are normal. No visual field deficit or scleral icterus.     Extraocular Movements: Extraocular movements intact.      Conjunctiva/sclera: Conjunctivae normal.      Pupils: Pupils are equal, round, and reactive to light.   Neck:      Trachea: Trachea and phonation normal.      Comments: There is a small palpable cyst vs lipoma, about marble sized, of the right posterior neck below base of skull.  Cardiovascular:      Rate and Rhythm: Normal rate and regular rhythm.      Pulses: Normal pulses.           Radial pulses are 2+ on the right side and 2+ on the left side.        Dorsalis pedis pulses are 2+ on the right side and 2+ on the left side.        Posterior tibial pulses are 2+ on the right side and 2+ on the left side.      Heart sounds: Normal heart sounds, S1 normal and S2 normal. No murmur  heard.  Pulmonary:      Effort: Pulmonary effort is normal. No tachypnea or respiratory distress.      Breath sounds: Normal breath sounds. No wheezing, rhonchi or rales.   Abdominal:      General: Bowel sounds are normal. There is no distension.      Palpations: Abdomen is soft.      Tenderness: There is no abdominal tenderness. There is no guarding or rebound.   Musculoskeletal:      Cervical back: Normal range of motion and neck supple.      Right lower leg: No edema.      Left lower leg: No edema.   Skin:     General: Skin is warm and dry.      Capillary Refill: Capillary refill takes less than 2 seconds.      Findings: No rash.   Neurological:      General: No focal deficit present.      Mental Status: He is alert and oriented to person, place, and time.      GCS: GCS eye subscore is 4. GCS verbal subscore is 5. GCS motor subscore is 6.      Cranial Nerves: Cranial nerves 2-12 are intact. No cranial nerve deficit, dysarthria or facial asymmetry.      Sensory: Sensation is intact.      Motor: Motor function is intact. No weakness or pronator drift.      Coordination: Coordination is intact. Finger-Nose-Finger Test normal.   Psychiatric:         Mood and Affect: Mood normal.         Speech: Speech normal.         Behavior: Behavior normal. Behavior is cooperative.         Vital Signs  ED Triage Vitals   Temperature Pulse Respirations Blood Pressure SpO2   03/25/24 1255 03/25/24 1254 03/25/24 1254 03/25/24 1254 03/25/24 1254   98.5 °F (36.9 °C) 80 16 128/78 98 %      Temp Source Heart Rate Source Patient Position - Orthostatic VS BP Location FiO2 (%)   03/25/24 1255 03/25/24 1254 03/25/24 1254 03/25/24 1254 --   Temporal Monitor Lying Right arm       Pain Score       03/25/24 1254       No Pain           Vitals:    03/25/24 1500 03/25/24 1637 03/25/24 1639 03/25/24 1640   BP: 112/59 120/76 135/88 116/85   Pulse: 63 (!) 52 58 58   Patient Position - Orthostatic VS: Lying            Visual Acuity  Visual Acuity       Flowsheet Row Most Recent Value   L Pupil Size (mm) 3   R Pupil Size (mm) 3   L Pupil Shape Round   R Pupil Shape Round            ED Medications  Medications   atorvastatin (LIPITOR) tablet 40 mg (has no administration in time range)   aspirin chewable tablet 81 mg (has no administration in time range)   enoxaparin (LOVENOX) subcutaneous injection 40 mg (has no administration in time range)   meclizine (ANTIVERT) tablet 25 mg (has no administration in time range)       Diagnostic Studies  Results Reviewed       Procedure Component Value Units Date/Time    HS Troponin I 2hr [[id number]]  (Normal) Collected: 03/25/24 1543    Lab Status: Final result Specimen: Blood from Arm, Right Updated: 03/25/24 1617     hs TnI 2hr 4 ng/L      Delta 2hr hsTnI 0 ng/L     HS Troponin I 4hr [[id number]]     Lab Status: No result Specimen: Blood     Urine Microscopic [[id number]]  (Normal) Collected: 03/25/24 1448    Lab Status: Final result Specimen: Urine, Clean Catch Updated: 03/25/24 1548     RBC, UA 0-1 /hpf      WBC, UA 0-1 /hpf      Epithelial Cells None Seen /hpf      Bacteria, UA Occasional /hpf     UA w Reflex to Microscopic w Reflex to Culture [[id number]]  (Abnormal) Collected: 03/25/24 1448    Lab Status: Final result Specimen: Urine, Clean Catch Updated: 03/25/24 1515     Color, UA Yellow     Clarity, UA Clear     Specific Gravity, UA 1.025     pH, UA 6.5     Leukocytes, UA Negative     Nitrite, UA Negative     Protein, UA Negative mg/dl      Glucose, UA Negative mg/dl      Ketones, UA Negative mg/dl      Urobilinogen, UA 0.2 E.U./dl      Bilirubin, UA Negative     Occult Blood, UA Trace-Intact    HS Troponin 0hr (reflex protocol) [[id number]]  (Normal) Collected: 03/25/24 1349    Lab Status: Final result Specimen: Blood from Arm, Left Updated: 03/25/24 1432     hs TnI 0hr 4 ng/L     Comprehensive metabolic panel [[id number]]  (Abnormal) Collected: 03/25/24 1349    Lab Status: Final result Specimen: Blood from Arm, Left  Updated: 03/25/24 1426     Sodium 144 mmol/L      Potassium 4.0 mmol/L      Chloride 107 mmol/L      CO2 28 mmol/L      ANION GAP 9 mmol/L      BUN 10 mg/dL      Creatinine 1.00 mg/dL      Glucose 77 mg/dL      Calcium 9.2 mg/dL      AST 20 U/L      ALT 24 U/L      Alkaline Phosphatase 51 U/L      Total Protein 6.8 g/dL      Albumin 4.0 g/dL      Total Bilirubin 1.26 mg/dL      eGFR 84 ml/min/1.73sq m     Narrative:      National Kid[employer] Disease Foundation guidelines for Chronic Kidney Disease (CKD):     Stage 1 with normal or high GFR (GFR > 90 mL/min/1.73 square meters)    Stage 2 Mild CKD (GFR = 60-89 mL/min/1.73 square meters)    Stage 3A Moderate CKD (GFR = 45-59 mL/min/1.73 square meters)    Stage 3B Moderate CKD (GFR = 30-44 mL/min/1.73 square meters)    Stage 4 Severe CKD (GFR = 15-29 mL/min/1.73 square meters)    Stage 5 End Stage CKD (GFR <15 mL/min/1.73 square meters)  Note: GFR calculation is accurate only with a steady state creatinine    Magnesium [[id number]]  (Normal) Collected: 03/25/24 1349    Lab Status: Final result Specimen: Blood from Arm, Left Updated: 03/25/24 1426     Magnesium 1.9 mg/dL     Protime-INR [[id number]]  (Normal) Collected: 03/25/24 1349    Lab Status: Final result Specimen: Blood from Arm, Left Updated: 03/25/24 1419     Protime 13.1 seconds      INR 1.00    APTT [[id number]]  (Normal) Collected: 03/25/24 1349    Lab Status: Final result Specimen: Blood from Arm, Left Updated: 03/25/24 1419     PTT 28 seconds     CBC and differential [[id number]]  (Abnormal) Collected: 03/25/24 1349    Lab Status: Final result Specimen: Blood from Arm, Left Updated: 03/25/24 1409     WBC 5.42 Thousand/uL      RBC 4.87 Million/uL      Hemoglobin 14.1 g/dL      Hematocrit 44.5 %      MCV 91 fL      MCH 29.0 pg      MCHC 31.7 g/dL      RDW 12.9 %      MPV 9.2 fL      Platelets 226 Thousands/uL      nRBC 0 /100 WBCs      Neutrophils Relative 40 %      Immature Grans % 0 %      Lymphocytes Relative 44 %       Monocytes Relative 12 %      Eosinophils Relative 3 %      Basophils Relative 1 %      Neutrophils Absolute 2.19 Thousands/µL      Absolute Immature Grans 0.01 Thousand/uL      Absolute Lymphocytes 2.38 Thousands/µL      Absolute Monocytes 0.65 Thousand/µL      Eosinophils Absolute 0.14 Thousand/µL      Basophils Absolute 0.05 Thousands/µL                    XR chest 1 view portable    (Results Pending)   MRI Inpatient Order    (Results Pending)              Procedures  ECG 12 Lead Documentation Only    Date/Time: 3/25/2024 1:02 PM    Performed by: Yee Lee PA-C  Authorized by: Yee Lee PA-C    Indications / Diagnosis:  Dizziness  ECG reviewed by me, the ED Provider: yes    Patient location:  ED  Previous ECG:     Comparison to cardiac monitor: Yes    Interpretation:     Interpretation: non-specific    Rate:     ECG rate:  73    ECG rate assessment: normal    Rhythm:     Rhythm: sinus rhythm    Ectopy:     Ectopy: none    QRS:     QRS axis:  Normal    QRS intervals:  Normal  Conduction:     Conduction: normal    ST segments:     ST segments:  Normal  T waves:     T waves: normal    Comments:      , QRS 86, QT/QTc 368/405; no acute ischemic changes, low voltage QRS           ED Course  ED Course as of 03/25/24 1710   Mon Mar 25, 2024   1315 Reviewed prior ER record from 3/23/24.  Had full work up to include EKG, labs, CTA head/neck.  There was abnormality on CTA head/neck and case was reviewed with neurologist.  [place] to be artifactual according to documentation.  However pt was offered admission for MRI further evaluation/management and pt elected to go home.   1332 Covid/flu/rsv testing on 3/23/24 was negative.   1352 TT to on call [facility] to discuss admission.   1414 WBC: 5.42   1414 Hemoglobin: 14.1   1414 Platelet Count: 226   1419 POCT INR: 1.00   1419 PROTIME: 13.1   1419 PTT: 28   1423 XR chest 1 view portable  Independently viewed and interpreted by me - no acute cardiopulmonary  process; pending official read.   1423 Discussed with Dr. Gamboa of [facility]; accepts in obs.  Initial conversation was had regarding obtaining MRI in ED with recommendation of discharge from ER if negative.  However I did relay that pt having symptomatic vertigo and ultimately recommend admission for further observation and formal neurology consult even if negative MRI.   1423 Pt had orthostatic performed, felt dizzy with standing.  No noted drop in BP.   1428 GLUCOSE: 77   1428 Creatinine: 1.00  stable   1428 BUN: 10   1428 Sodium: 144   1428 Potassium: 4.0   1428 Chloride: 107   1428 Carbon Dioxide: 28   1428 ANION GAP: 9   1428 Calcium: 9.2   1428 AST: 20   1428 ALT: 24   1428 ALK PHOS: 51   1428 Total Protein: 6.8   1428 Albumin: 4.0   1428 Total Bilirubin(!): 1.26   1428 GFR, Calculated: 84   1428 MAGNESIUM: 1.9   1432 hs TnI 0hr: 4  Value of 7, two days ago.  Does not appear to be ACS.   1433 Pt updated on results.  Feeling okay while still but has symptoms even with standing.  Agreeable to plan of care as discussed.       CTA head/neck with IV contrast from 3/23/24 -   IMPRESSION:     1.  No acute intracranial hemorrhage, mass effect or extra-axial collection.     2. Focal filling defect in the left V4 vertebral artery segment concerning for either a short segment arterial dissection or vascular web.     3.  No intracranial aneurysm.  No hemodynamically significant stenosis or occlusion of the major vessels of the [place].  ADDENDUM:     Findings in the left V4 segment vertebral artery is more likely to represent an incidental vascular anomaly such as a web or fenestration and less likely to reflect a dissection.          HEART Risk Score      Flowsheet Row Most Recent Value   Heart Score Risk Calculator    History 0 Filed at: 03/25/2024 1353   ECG 0 Filed at: 03/25/2024 1353   Age 1 Filed at: 03/25/2024 1353   Risk Factors 1 Filed at: 03/25/2024 1353   Troponin 0 Filed at: 03/25/2024 1353   HEART  Score 2 Filed at: 03/25/2024 1353             Stroke Assessment       Row Name 03/25/24 1313             NIH Stroke Scale    Interval Baseline      Level of Consciousness (1a.) 0      LOC Questions (1b.) 0      LOC Commands (1c.) 0      Best Gaze (2.) 0      Visual (3.) 0      Facial Palsy (4.) 0      Motor Arm, Left (5a.) 0      Motor Arm, Right (5b.) 0      Motor Leg, Left (6a.) 0      Motor Leg, Right (6b.) 0      Limb Ataxia (7.) 0      Sensory (8.) 0      Best Language (9.) 0      Dysarthria (10.) 0      Extinction and Inattention (11.) (Formerly Neglect) 0      Total 0                    Flowsheet Row Most Recent Value   Thrombolytic Decision Options    Thrombolytic Decision Patient not a candidate.   Patient is not a candidate options Unclear time of onset outside appropriate time window.                      SBIRT 22yo+      Flowsheet Row Most Recent Value   Initial Alcohol Screen: US AUDIT-C     1. How often do you have a drink containing alcohol? 0 Filed at: 03/25/2024 1255   2. How many drinks containing alcohol do you have on a typical day you are drinking?  0 Filed at: 03/25/2024 1255   3a. Male UNDER 65: How often do you have five or more drinks on one occasion? 0 Filed at: 03/25/2024 1255   3b. FEMALE Any Age, or MALE 65+: How often do you have 4 or more drinks on one occassion? 0 Filed at: 03/25/2024 1255   Audit-C Score 0 Filed at: 03/25/2024 1255                      Medical Decision Making  55 yo male presenting for evaluation of persistent dizziness, head spinning.  This appears vertiginous in nature.  Prior records reviewed to include recent ER visit, labs, CTA head/neck. There was anomaly noted on CTA head/neck at that time.  Pt having persistent symptoms.  Has failed outpatient management with meclizine.  No focal stroke findings.  Pt is having issues with gait and feeling unsteady while ambulating.  Anticipate admission for further evaluation to include MRI and neurology.    Work up obtained  as noted above.  EKG and troponin not c/w ACS.  CXR does not reveal pneumonia, pneumothorax, vascular congestion or pleural effusion.  No noted leukocytosis, no anemia.  No infectious concerns.  No hypo or hyperglycemia.  Renal function within normal limits.  Electrolytes within normal limits.  UA not suggestive of infection.  No orthostasis with vital signs although pt very symptomatic with standing.  Repeat CT imaging deferred at this time. Would recommend MRI to further evaluate as previously discussed.  I feel this would be best suited to be done in inpatient setting with formal neuro consult.    [facility] consulted for admission.    Please refer to above ER course for further details/discussion.          Problems Addressed:  Abnormal computed tomography angiography (CTA) of neck: acute illness or injury     Details: See prior documentation.  Unclear significance of finding.  Dizziness: acute illness or injury  Vertigo: acute illness or injury    Amount and/or Complexity of Data Reviewed  External Data Reviewed: labs, radiology, ECG and notes.  Labs: ordered. Decision-making details documented in ED Course.  Radiology: ordered and independent interpretation performed. Decision-making details documented in ED Course.  ECG/medicine tests: ordered and independent interpretation performed. Decision-making details documented in ED Course.  Discussion of management or test interpretation with external provider(s): Attending, [facility]    Risk  Prescription drug management.  Decision regarding hospitalization.             Disposition  Final diagnoses:   Dizziness   Vertigo   Abnormal computed tomography angiography (CTA) of neck     Time reflects when diagnosis was documented in both MDM as applicable and the Disposition within this note       Time User Action Codes Description Comment    3/25/2024  2:24 PM Yee Lee Add [R42] Dizziness     3/25/2024  2:24 PM Yee Lee Add [R42] Vertigo     3/25/2024  2:24 PM  Yee Lee Add [R93.89] Abnormal computed tomography angiography (CTA) of neck           ED Disposition       ED Disposition   Admit    Condition   Stable    Date/Time   Mon Mar 25, 2024 1967    Comment   Case was discussed with Dr. Gamboa and the patient's admission status was agreed to be Admission Status: observation status to the service of Dr. Gamboa.               Follow-up Information    None         Current Discharge Medication List        CONTINUE these medications which have NOT CHANGED    Details   cyclobenzaprine (FLEXERIL) 5 mg tablet Take 1 tablet (5 mg total) by mouth 3 (three) times a day as needed for muscle spasms  Qty: 30 tablet, Refills: 0    Associated Diagnoses: Acute left-sided low back pain with left-sided sciatica      meclizine (ANTIVERT) 25 mg tablet Take 1 tablet (25 mg total) by mouth 3 (three) times a day as needed for dizziness  Qty: 30 tablet, Refills: 0    Associated Diagnoses: Vertigo             No discharge procedures on file.    PDMP Review       None            ED Provider  Electronically Signed by             Yee Lee PA-C  03/25/24 5047

## 2024-03-25 NOTE — PLAN OF CARE
Problem: PAIN - ADULT  Goal: Verbalizes/displays adequate comfort level or baseline comfort level  Description: Interventions:  - Encourage patient to monitor pain and request assistance  - Assess pain using appropriate pain scale  - Administer analgesics based on type and severity of pain and evaluate response  - Implement non-pharmacological measures as appropriate and evaluate response  - Consider cultural and social influences on pain and pain management  - Notify physician/advanced practitioner if interventions unsuccessful or patient reports new pain  Outcome: Progressing     Problem: SAFETY ADULT  Goal: Patient will remain free of falls  Description: INTERVENTIONS:  - Educate patient/family on patient safety including physical limitations  - Instruct patient to call for assistance with activity   - Consult OT/PT to assist with strengthening/mobility   - Keep Call bell within reach  - Keep bed low and locked with side rails adjusted as appropriate  - Keep care items and personal belongings within reach  - Initiate and maintain comfort rounds  - Make Fall Risk Sign visible to staff  - Offer Toileting every two Hours, in advance of need  - Initiate/Maintain bed/chair alarm  - Obtain necessary fall risk management equipment: bed/chair alarm   - Apply yellow socks and bracelet for high fall risk patients  - Consider moving patient to room near nurses station  Outcome: Progressing  Goal: Maintain or return to baseline ADL function  Description: INTERVENTIONS:  -  Assess patient's ability to carry out ADLs; assess patient's baseline for ADL function and identify physical deficits which impact ability to perform ADLs (bathing, care of mouth/teeth, toileting, grooming, dressing, etc.)  - Assess/evaluate cause of self-care deficits   - Assess range of motion  - Assess patient's mobility; develop plan if impaired  - Assess patient's need for assistive devices and provide as appropriate  - Encourage maximum  independence but intervene and supervise when necessary  - Involve family in performance of ADLs  - Assess for home care needs following discharge   - Consider OT consult to assist with ADL evaluation and planning for discharge  - Provide patient education as appropriate  Outcome: Progressing  Goal: Maintains/Returns to pre admission functional level  Description: INTERVENTIONS:  - Perform AM-PAC 6 Click Basic Mobility/ Daily Activity assessment daily.  - Set and communicate daily mobility goal to care team and patient/family/caregiver.   - Collaborate with rehabilitation services on mobility goals if consulted  - Out of bed to chair three times a day   - Out of bed for meals three times a day  - Out of bed for toileting  - Record patient progress and toleration of activity level   Outcome: Progressing     Problem: DISCHARGE PLANNING  Goal: Discharge to home or other facility with appropriate resources  Description: INTERVENTIONS:  - Identify barriers to discharge w/patient and caregiver  - Arrange for needed discharge resources and transportation as appropriate  - Identify discharge learning needs (meds, wound care, etc.)  - Arrange for interpretive services to assist at discharge as needed  - Refer to Case Management Department for coordinating discharge planning if the patient needs post-hospital services based on physician/advanced practitioner order or complex needs related to functional status, cognitive ability, or social support system  Outcome: Progressing     Problem: Knowledge Deficit  Goal: Patient/family/caregiver demonstrates understanding of disease process, treatment plan, medications, and discharge instructions  Outcome: Progressing     Problem: Neurological Deficit  Goal: Neurological status is stable or improving  Description: Interventions:  - Monitor and assess patient's level of consciousness, motor function, sensory function, and level of assistance needed for ADLs.   - Monitor and report  changes from baseline. Collaborate with interdisciplinary team to initiate plan and implement interventions as ordered.   - Provide and maintain a safe environment.  - Consider seizure precautions.  - Consider fall precautions.  - Consider aspiration precautions.  - Consider bleeding precautions.  Outcome: Progressing     Problem: Activity Intolerance/Impaired Mobility  Goal: Mobility/activity is maintained at optimum level for patient  Description: Interventions:  - Assess and monitor patient  barriers to mobility and need for assistive/adaptive devices.  - Assess patient's emotional response to limitations.  - Collaborate with interdisciplinary team and initiate plans and interventions as ordered.  - Encourage independent activity per ability.  - Maintain proper body alignment.  - Perform active/passive rom as tolerated/ordered.  - Plan activities to conserve energy.  - Turn patient as appropriate  Outcome: Progressing     Problem: Communication Impairment  Goal: Ability to express needs and understand communication  Description: Assess patient's communication skills and ability to understand information.  Patient will demonstrate use of effective communication techniques, alternative methods of communication and understanding even if not able to speak.     - Encourage communication and provide alternate methods of communication as needed.  - Collaborate with case management/ for discharge needs.  - Include patient/family/caregiver in decisions related to communication.  Outcome: Progressing

## 2024-03-25 NOTE — LETTER
[facility] MEDICAL SURGICAL UNIT  [address] [place] [address]  Dept: [phone]    March 26, 2024     Patient: Jeffrey Vann   YOB: 1970   Date of Visit: 3/25/2024       To Whom it May Concern:    Jeffrey Vann is under my professional care. He was seen in the hospital from 3/25/2024 to 03/26/24. He may return to work on 4/1/2024 with the following limitations light duty .    If you have any questions or concerns, please don't hesitate to call.         Sincerely,          Isabella Rocha PA-C

## 2024-03-25 NOTE — ED NOTES
While performing patient's orthostatic vital signs, patient stated he felt dizzy upon standing. Provider made aware.    Patient given urinal at this time per patient request.     Jeremy Calderon  03/25/24 [id number]

## 2024-03-26 ENCOUNTER — APPOINTMENT (OUTPATIENT)
Dept: MRI IMAGING | Facility: HOSPITAL | Age: 54
End: 2024-03-26
Payer: COMMERCIAL

## 2024-03-26 ENCOUNTER — TRANSITIONAL CARE MANAGEMENT (OUTPATIENT)
Dept: FAMILY MEDICINE CLINIC | Facility: CLINIC | Age: 54
End: 2024-03-26

## 2024-03-26 VITALS
RESPIRATION RATE: 18 BRPM | OXYGEN SATURATION: 99 % | HEART RATE: 69 BPM | BODY MASS INDEX: 26.14 KG/M2 | SYSTOLIC BLOOD PRESSURE: 129 MMHG | HEIGHT: 72 IN | DIASTOLIC BLOOD PRESSURE: 87 MMHG | TEMPERATURE: 98.3 F | WEIGHT: 193 LBS

## 2024-03-26 LAB
ANION GAP SERPL CALCULATED.3IONS-SCNC: 6 MMOL/L (ref 4–13)
ATRIAL RATE: 73 BPM
BASOPHILS # BLD AUTO: 0.08 THOUSANDS/ÂΜL (ref 0–0.1)
BASOPHILS NFR BLD AUTO: 1 % (ref 0–1)
BUN SERPL-MCNC: 11 MG/DL (ref 5–25)
CALCIUM SERPL-MCNC: 8.7 MG/DL (ref 8.4–10.2)
CHLORIDE SERPL-SCNC: 109 MMOL/L (ref 96–108)
CHOLEST SERPL-MCNC: 143 MG/DL
CO2 SERPL-SCNC: 26 MMOL/L (ref 21–32)
CREAT SERPL-MCNC: 0.88 MG/DL (ref 0.6–1.3)
EOSINOPHIL # BLD AUTO: 0.24 THOUSAND/ÂΜL (ref 0–0.61)
EOSINOPHIL NFR BLD AUTO: 4 % (ref 0–6)
ERYTHROCYTE [DISTWIDTH] IN BLOOD BY AUTOMATED COUNT: 12.9 % (ref 11.6–15.1)
EST. AVERAGE GLUCOSE BLD GHB EST-MCNC: 114 MG/DL
GFR SERPL CREATININE-BSD FRML MDRD: 97 ML/MIN/1.73SQ M
GLUCOSE SERPL-MCNC: 88 MG/DL (ref 65–140)
HBA1C MFR BLD: 5.6 %
HCT VFR BLD AUTO: 41.4 % (ref 36.5–49.3)
HDLC SERPL-MCNC: 44 MG/DL
HGB BLD-MCNC: 13.2 G/DL (ref 12–17)
IMM GRANULOCYTES # BLD AUTO: 0.01 THOUSAND/UL (ref 0–0.2)
IMM GRANULOCYTES NFR BLD AUTO: 0 % (ref 0–2)
LDLC SERPL CALC-MCNC: 91 MG/DL (ref 0–100)
LYMPHOCYTES # BLD AUTO: 4 THOUSANDS/ÂΜL (ref 0.6–4.47)
LYMPHOCYTES NFR BLD AUTO: 59 % (ref 14–44)
MCH RBC QN AUTO: 28.9 PG (ref 26.8–34.3)
MCHC RBC AUTO-ENTMCNC: 31.9 G/DL (ref 31.4–37.4)
MCV RBC AUTO: 91 FL (ref 82–98)
MONOCYTES # BLD AUTO: 0.68 THOUSAND/ÂΜL (ref 0.17–1.22)
MONOCYTES NFR BLD AUTO: 10 % (ref 4–12)
NEUTROPHILS # BLD AUTO: 1.75 THOUSANDS/ÂΜL (ref 1.85–7.62)
NEUTS SEG NFR BLD AUTO: 26 % (ref 43–75)
NRBC BLD AUTO-RTO: 0 /100 WBCS
P AXIS: 63 DEGREES
PLATELET # BLD AUTO: 191 THOUSANDS/UL (ref 149–390)
PMV BLD AUTO: 9.1 FL (ref 8.9–12.7)
POTASSIUM SERPL-SCNC: 4 MMOL/L (ref 3.5–5.3)
PR INTERVAL: 142 MS
QRS AXIS: 49 DEGREES
QRSD INTERVAL: 86 MS
QT INTERVAL: 368 MS
QTC INTERVAL: 405 MS
RBC # BLD AUTO: 4.56 MILLION/UL (ref 3.88–5.62)
SODIUM SERPL-SCNC: 141 MMOL/L (ref 135–147)
T WAVE AXIS: 44 DEGREES
TRIGL SERPL-MCNC: 38 MG/DL
VENTRICULAR RATE: 73 BPM
WBC # BLD AUTO: 6.76 THOUSAND/UL (ref 4.31–10.16)

## 2024-03-26 PROCEDURE — 93010 ELECTROCARDIOGRAM REPORT: CPT | Performed by: INTERNAL MEDICINE

## 2024-03-26 PROCEDURE — 99239 HOSP IP/OBS DSCHRG MGMT >30: CPT

## 2024-03-26 PROCEDURE — 70551 MRI BRAIN STEM W/O DYE: CPT

## 2024-03-26 PROCEDURE — 80048 BASIC METABOLIC PNL TOTAL CA: CPT

## 2024-03-26 PROCEDURE — 83036 HEMOGLOBIN GLYCOSYLATED A1C: CPT

## 2024-03-26 PROCEDURE — 80061 LIPID PANEL: CPT

## 2024-03-26 PROCEDURE — 85025 COMPLETE CBC W/AUTO DIFF WBC: CPT

## 2024-03-26 PROCEDURE — 97166 OT EVAL MOD COMPLEX 45 MIN: CPT

## 2024-03-26 RX ADMIN — ASPIRIN 81 MG 81 MG: 81 TABLET ORAL at 08:11

## 2024-03-26 RX ADMIN — ENOXAPARIN SODIUM 40 MG: 40 INJECTION SUBCUTANEOUS at 08:12

## 2024-03-26 NOTE — DISCHARGE SUMMARY
[facility]  Discharge- Jeffrey Vann 1970, 54 y.o. male MRN: [id number]  Unit/Bed#: 414-01 Encounter: [id number]  Primary Care Provider: Zoila Wells PA-C   Date and time admitted to hospital: 3/25/2024 12:51 PM    * Vertigo  Assessment & Plan  Presents with episodic vertigo since Thursday worsened by head movements, position changes, ambulation. Resolves when laying on left side. No other neuro sxs, no focal deficits. Reports sinus infection a few weeks prior. Was seen for same 3/23 and returns given persistent symptoms & no improvement with meclizine.  CTA H/N: no acute intracranial abnormality, focal filling defect in left V4 vertebral arery segment. No LVO or significant stenosis   Finding reviewed with neuro on 3/23 by ED provider, felt to be artifact   Orthostatics negative  Posterior CVA ruled out  MRI brain: negative for acute infarction, edema or mass effect. Non-specific white matter changes  Discussed findings with neurology on call who recommended no further inpatient or outpatient neurology follow-up. They recommended referral to ENT.   Likely BPPV, possible vestibular neuritis?  Provided referral to vestibular therapy  Instructed patient not to drive nor operate heavy machinery during vertigo episodes   Discontinue aspirin/statin  TSH and lipid panel wnl. A1c pending.  Neurology consult appreciated        Medical Problems       Resolved Problems  Date Reviewed: 3/26/2024   None       Discharging Physician / Practitioner: Isabella Rocha PA-C  PCP: Zoila Wells PA-C  Admission Date:   Admission Orders (From admission, onward)       Ordered        03/25/24 1429  Place in Observation  Once                          Discharge Date: 03/26/24    Consultations During Hospital Stay:  Neurology    Procedures Performed:   None    Significant Findings / Test Results:   MRI brain wo: No acute infarction, edema, or mass effect. Few punctate hyperintense T2/FLAIR foci are  noted within the periventricular and subcortical white matter which are nonspecific and can be seen with vasculitis, migrainous angiopathy, or precocious microangiopathic changes     Incidental Findings:   None       Test Results Pending at Discharge (will require follow up):   A1c     Outpatient Tests Requested:  Outpatient follow-up with ENT  Outpatient referral to vesticular therapy   Outpatient follow-up with PCP in 5-7 days    Complications:  none    Reason for Admission: vertigo    Hospital Course:   Jeffrey Vann is a 54 y.o. male patient who originally presented to the hospital on 3/25/2024 due to recurrent episodic vertigo since Thursday.  He was admitted on stroke pathway to rule out posterior CVA with neurology consult.  He had MRI that was negative for acute infarct.  Also noted with symptomatic improvement in vertigo.  Presentation most consistent with peripheral vestibulopathy.  Neurology recommended no further inpatient neurological workup and no need for outpatient follow-up.  Neurology recommends outpatient ENT follow-up.  He was additionally provided referral to vestibular therapy.  Patient was instructed he is not to drive or operate heavy machinery during episodes of vertigo.  He was encouraged to continue meclizine as needed and maintain good hydration.  Patient discharged home with outpatient follow-up no acute distress.  He was instructed to follow-up with PCP in 5 to 7 days.        Please see above list of diagnoses and related plan for additional information.     Condition at Discharge: stable    Discharge Day Visit / Exam:   Subjective:  patient seen and examined. Reports feeling improved today. Notes his vertigo is less severe and does then subside if he changes his positions very closely. No other new symptoms  Vitals: Blood Pressure: 129/87 (03/26/24 1335)  Pulse: 69 (03/26/24 1335)  Temperature: 98.3 °F (36.8 °C) (03/26/24 0729)  Temp Source: Oral (03/26/24 0729)  Respirations: 18  (03/26/24 0729)  Height: 6' (182.9 cm) (03/25/24 1507)  Weight - Scale: 87.5 kg (193 lb) (03/25/24 1507)  SpO2: 99 % (03/26/24 1335)  Exam:   Physical Exam  HENT:      Head: Normocephalic and atraumatic.   Cardiovascular:      Rate and Rhythm: Normal rate and regular rhythm.   Pulmonary:      Effort: Pulmonary effort is normal. No respiratory distress.      Breath sounds: Normal breath sounds. No wheezing or rales.   Abdominal:      General: Abdomen is flat. Bowel sounds are normal. There is no distension.      Palpations: Abdomen is soft.   Musculoskeletal:      Right lower leg: No edema.      Left lower leg: No edema.   Skin:     General: Skin is warm and dry.   Neurological:      General: No focal deficit present.      Mental Status: He is alert and oriented to person, place, and time.      GCS: GCS eye subscore is 4. GCS verbal subscore is 5. GCS motor subscore is 6.      Cranial Nerves: Cranial nerves 2-12 are intact. No cranial nerve deficit, dysarthria or facial asymmetry.      Motor: No weakness.      Coordination: Finger-Nose-Finger Test and Heel to Shin Test normal.          Discussion with Family: Updated  (wife) at bedside.    Discharge instructions/Information to patient and family:   See after visit summary for information provided to patient and family.      Provisions for Follow-Up Care:  See after visit summary for information related to follow-up care and any pertinent home health orders.      Mobility at time of Discharge:   Basic Mobility Inpatient Raw Score: 21  JH-HLM Goal: 6: Walk 10 steps or more  JH-HLM Achieved: 6: Walk 10 steps or more  HLM Goal achieved. Continue to encourage appropriate mobility.     Disposition:   Home    Planned Readmission: none     Discharge Statement:  I spent 35 minutes discharging the patient. This time was spent on the day of discharge. I had direct contact with the patient on the day of discharge. Greater than 50% of the total time was spent  examining patient, answering all patient questions, arranging and discussing plan of care with patient as well as directly providing post-discharge instructions.  Additional time then spent on discharge activities.    Discharge Medications:  See after visit summary for reconciled discharge medications provided to patient and/or family.      **Please Note: This note may have been constructed using a voice recognition system**

## 2024-03-26 NOTE — ASSESSMENT & PLAN NOTE
Presents with episodic vertigo since Thursday worsened by head movements, position changes, ambulation. Resolves when laying on left side. No other neuro sxs, no focal deficits. Reports sinus infection a few weeks prior. Was seen for same 3/23 and returns given persistent symptoms & no improvement with meclizine.  CTA H/N: no acute intracranial abnormality, focal filling defect in left V4 vertebral arery segment. No LVO or significant stenosis   Finding reviewed with neuro on 3/23 by ED provider, felt to be artifact   Orthostatics negative  Posterior CVA ruled out  MRI brain: negative for acute infarction, edema or mass effect. Non-specific white matter changes  Discussed findings with neurology on call who recommended no further inpatient or outpatient neurology follow-up. They recommended referral to ENT.   Likely BPPV, possible vestibular neuritis?  Provided referral to vestibular therapy  Instructed patient not to drive nor operate heavy machinery during vertigo episodes   Discontinue aspirin/statin  TSH and lipid panel wnl. A1c pending.  Neurology consult appreciated

## 2024-03-26 NOTE — NURSING NOTE
Pt dc to home. Dc instructions given and reviewed with pt. Pt verbalized understanding. Pt left via wheelchair with wife and pca.

## 2024-03-26 NOTE — PLAN OF CARE
Problem: PAIN - ADULT  Goal: Verbalizes/displays adequate comfort level or baseline comfort level  Description: Interventions:  - Encourage patient to monitor pain and request assistance  - Assess pain using appropriate pain scale  - Administer analgesics based on type and severity of pain and evaluate response  - Implement non-pharmacological measures as appropriate and evaluate response  - Consider cultural and social influences on pain and pain management  - Notify physician/advanced practitioner if interventions unsuccessful or patient reports new pain  Outcome: Progressing     Problem: SAFETY ADULT  Goal: Patient will remain free of falls  Description: INTERVENTIONS:  - Educate patient/family on patient safety including physical limitations  - Instruct patient to call for assistance with activity   - Consult OT/PT to assist with strengthening/mobility   - Keep Call bell within reach  - Keep bed low and locked with side rails adjusted as appropriate  - Keep care items and personal belongings within reach  - Initiate and maintain comfort rounds  - Make Fall Risk Sign visible to staff  - Offer Toileting every two Hours, in advance of need  - Initiate/Maintain bed/chair alarm  - Obtain necessary fall risk management equipment: bed/chair alarm   - Apply yellow socks and bracelet for high fall risk patients  - Consider moving patient to room near nurses station  Outcome: Progressing  Goal: Maintain or return to baseline ADL function  Description: INTERVENTIONS:  -  Assess patient's ability to carry out ADLs; assess patient's baseline for ADL function and identify physical deficits which impact ability to perform ADLs (bathing, care of mouth/teeth, toileting, grooming, dressing, etc.)  - Assess/evaluate cause of self-care deficits   - Assess range of motion  - Assess patient's mobility; develop plan if impaired  - Assess patient's need for assistive devices and provide as appropriate  - Encourage maximum  independence but intervene and supervise when necessary  - Involve family in performance of ADLs  - Assess for home care needs following discharge   - Consider OT consult to assist with ADL evaluation and planning for discharge  - Provide patient education as appropriate  Outcome: Progressing  Goal: Maintains/Returns to pre admission functional level  Description: INTERVENTIONS:  - Perform AM-PAC 6 Click Basic Mobility/ Daily Activity assessment daily.  - Set and communicate daily mobility goal to care team and patient/family/caregiver.   - Collaborate with rehabilitation services on mobility goals if consulted  - Out of bed to chair three times a day   - Out of bed for meals three times a day  - Out of bed for toileting  - Record patient progress and toleration of activity level   Outcome: Progressing     Problem: DISCHARGE PLANNING  Goal: Discharge to home or other facility with appropriate resources  Description: INTERVENTIONS:  - Identify barriers to discharge w/patient and caregiver  - Arrange for needed discharge resources and transportation as appropriate  - Identify discharge learning needs (meds, wound care, etc.)  - Arrange for interpretive services to assist at discharge as needed  - Refer to Case Management Department for coordinating discharge planning if the patient needs post-hospital services based on physician/advanced practitioner order or complex needs related to functional status, cognitive ability, or social support system  Outcome: Progressing     Problem: Knowledge Deficit  Goal: Patient/family/caregiver demonstrates understanding of disease process, treatment plan, medications, and discharge instructions  Outcome: Progressing     Problem: Neurological Deficit  Goal: Neurological status is stable or improving  Description: Interventions:  - Monitor and assess patient's level of consciousness, motor function, sensory function, and level of assistance needed for ADLs.   - Monitor and report  changes from baseline. Collaborate with interdisciplinary team to initiate plan and implement interventions as ordered.   - Provide and maintain a safe environment.  - Consider seizure precautions.  - Consider fall precautions.  - Consider aspiration precautions.  - Consider bleeding precautions.  Outcome: Progressing     Problem: Activity Intolerance/Impaired Mobility  Goal: Mobility/activity is maintained at optimum level for patient  Description: Interventions:  - Assess and monitor patient  barriers to mobility and need for assistive/adaptive devices.  - Assess patient's emotional response to limitations.  - Collaborate with interdisciplinary team and initiate plans and interventions as ordered.  - Encourage independent activity per ability.  - Maintain proper body alignment.  - Perform active/passive rom as tolerated/ordered.  - Plan activities to conserve energy.  - Turn patient as appropriate  Outcome: Progressing     Problem: Communication Impairment  Goal: Ability to express needs and understand communication  Description: Assess patient's communication skills and ability to understand information.  Patient will demonstrate use of effective communication techniques, alternative methods of communication and understanding even if not able to speak.     - Encourage communication and provide alternate methods of communication as needed.  - Collaborate with case management/ for discharge needs.  - Include patient/family/caregiver in decisions related to communication.  Outcome: Progressing      ED MD

## 2024-03-26 NOTE — OCCUPATIONAL THERAPY NOTE
"    Occupational Therapy Evaluation     Patient Name: Jeffrey Vann  Today's Date: 3/26/2024  Problem List  Principal Problem:    Vertigo    Past Medical History  Past Medical History:   Diagnosis Date    Asthma     Disease of thyroid gland     PTSD (post-traumatic stress disorder)      Past Surgical History  Past Surgical History:   Procedure Laterality Date    HERNIA REPAIR      2013    WRIST ARTHROPLASTY Right     2015 03/26/24 1341   OT Last Visit   OT Visit Date 03/26/24   Note Type   Note type Evaluation   Pain Assessment   Pain Assessment Tool 0-10   Pain Score No Pain   Restrictions/Precautions   Weight Bearing Precautions Per Order No   Other Precautions Multiple lines;Fall Risk   Home Living   Type of Home House   Home Layout Multi-level;Performs ADLs on one level;Able to live on main level with bedroom/bathroom   Bathroom Shower/Tub Tub/shower unit   Bathroom Toilet Standard   Bathroom Accessibility Accessible   Additional Comments pt reports no use of device at baseline during functional mobility   Prior Function   Level of [place] Independent with ADLs;Independent with functional mobility;Independent with IADLS   Lives With Spouse;Family   Receives Help From Family   IADLs Independent with driving   Falls in the last 6 months 0   Subjective   Subjective \"The symptoms are getting better\"   ADL   Where Assessed Edge of bed   LB Dressing Assistance 7  Independent   Additional Comments pt demonstrated LB dressing at (I) level   Bed Mobility   Supine to Sit 7  Independent   Additional Comments pt on RA during session; SP02 WFL with no complaints of SOB; pt ended session sitting EOB; orthostatic BP taken, BP WFL with mild complaints of dizziness during activity   Transfers   Sit to Stand 7  Independent   Stand to Sit 7  Independent   Additional Comments pt demonstrated functional transfers at (I) level with no use of device   Functional Mobility   Functional Mobility 7  Independent "   Additional Comments pt demonstrated 250ft of functional mobility with no use of device   Balance   Static Sitting Fair +   Dynamic Sitting Fair +   Static Standing Fair +   Dynamic Standing Fair +   Ambulatory Fair   Activity Tolerance   Activity Tolerance Patient tolerated treatment well   RUE Assessment   RUE Assessment WFL   LUE Assessment   LUE Assessment WFL   Hand Function   Gross Motor Coordination Functional   Fine Motor Coordination Functional   Psychosocial   Psychosocial (WDL) WDL   Perception   Inattention/Neglect Appears intact   Cognition   Overall Cognitive Status WFL   Arousal/Participation Alert;Cooperative   Attention Within functional limits   Orientation Level Oriented X4   Memory Within functional limits   Following Commands Follows all commands and directions without difficulty   Assessment   Assessment Patient is a 54 y.o. male [facility] due to Vertigo. Pt performed at (I) level with no OT needs identified at this time Comorbidities affecting pt's physical performance at time of assessment include  asthma, disease of thyroid gland, PTSD .  The patient's raw score on the -PAC Daily Activity Inpatient Short Form is 24. A raw score of greater than or equal to 19 suggests the patient may benefit from discharge to home. Please refer to the recommendation of the Occupational Therapist for safe discharge planning.  From OT standpoint recommend anticipate no needs upon D/C. No further acute OT needs identified at this time. Recommend continued mobilization with hospital staff and restorative services while in the hospital to increase pt’s endurance and strength upon D/C. From OT standpoint, recommend D/C to home with family support when medically cleared. D/C pt from OT caseload at this time. Pt benefited from co-evaluation of skilled OT and PT therapists in order to most appropriately address functional deficits d/t extensive assistance required for safe  functional mobility, decreased activity tolerance, and regression from functioning level prior to admission and/or onset of present illness. OT/PT objectives were addressed separately; please see PT note for specific goal areas targeted.   Goals   Patient Goals to go home   Discharge Recommendation   Rehab Resource Intensity Level, OT No post-acute rehabilitation needs   AM-PAC Daily Activity Inpatient   Lower Body Dressing 4   Bathing 4   Toileting 4   Upper Body Dressing 4   Grooming 4   Eating 4   Daily Activity Raw Score 24   Daily Activity Standardized Score (Calc for Raw Score >=11) 57.54   AM-PAC Applied Cognition Inpatient   Following a Speech/Presentation 4   Understanding Ordinary Conversation 4   Taking Medications 4   Remembering Where Things Are Placed or Put Away 4   Remembering List of 4-5 Errands 4   Taking Care of Complicated Tasks 4   Applied Cognition Raw Score 24   Applied Cognition Standardized Score 62.21

## 2024-03-26 NOTE — CASE MANAGEMENT
Case Management Discharge Planning Note    Patient name Jeffrey Vann  Location /414-01 MRN [id number]  : 1970 Date 3/26/2024       Current Admission Date: 3/25/2024  Current Admission Diagnosis:Vertigo   Patient Active Problem List    Diagnosis Date Noted    Vertigo 2024    Acute left-sided low back pain with left-sided sciatica 2023      LOS (days): 0  Geometric Mean LOS (GMLOS) (days):   Days to GMLOS:     OBJECTIVE:            Current admission status: Observation   Preferred Pharmacy:   [id number] - [address] [address]  Phone: [phone] Fax: [phone]    Primary Care Provider: Zoila Wells PA-C    Primary Insurance: BLUE CROSS  Secondary Insurance:     DISCHARGE DETAILS:          Chart review completed.    Patient discussed in Multi discipline team for stroke pathway.    MRI negative    Follow up providers listed on AVS.    Family to transport patient home.                                              Treatment Team Recommendation: Home  Discharge Destination Plan:: Home  Transport at Discharge : Family

## 2024-03-26 NOTE — SPEECH THERAPY NOTE
Speech Language/Pathology  Consult received.  Records reviewed.  Pt admitted symptoms concerning for CVA/TIA.  Pt passed RN Dysphagia Assessment.  Communication deficits denied. Swallowing Difficulties denied.  MRI brain displayed no acute abnormality. No additional inpatient Speech Pathology evaluation appears indicated at this time.  Please re-consult if additional concerns arise. Thank you.

## 2024-03-26 NOTE — DISCHARGE INSTR - AVS FIRST PAGE
Outpatient referral placed to ENT for evaluation. Please call for appointment as soon as possible   Do not drive or operate heaving machinery during an episode of vertigo.   Outpatient referral placed to vestibular therapy   Ensure to stay well hydrated and drink plenty of water  Continue meclizine as needed for dizziness   Follow-up with primary care provider in 5-7 days for transition of care appointment

## 2024-04-04 ENCOUNTER — OFFICE VISIT (OUTPATIENT)
Dept: FAMILY MEDICINE CLINIC | Facility: CLINIC | Age: 54
End: 2024-04-04
Payer: COMMERCIAL

## 2024-04-04 VITALS
SYSTOLIC BLOOD PRESSURE: 118 MMHG | HEART RATE: 94 BPM | HEIGHT: 72 IN | BODY MASS INDEX: 27.51 KG/M2 | WEIGHT: 203.1 LBS | OXYGEN SATURATION: 97 % | DIASTOLIC BLOOD PRESSURE: 66 MMHG

## 2024-04-04 DIAGNOSIS — Z09 HOSPITAL DISCHARGE FOLLOW-UP: Primary | ICD-10-CM

## 2024-04-04 DIAGNOSIS — K04.7 DENTAL INFECTION: ICD-10-CM

## 2024-04-04 DIAGNOSIS — R42 VERTIGO: ICD-10-CM

## 2024-04-04 PROCEDURE — 99495 TRANSJ CARE MGMT MOD F2F 14D: CPT | Performed by: PHYSICIAN ASSISTANT

## 2024-04-04 RX ORDER — MECLIZINE HYDROCHLORIDE 25 MG/1
25 TABLET ORAL 3 TIMES DAILY PRN
Qty: 30 TABLET | Refills: 0 | Status: SHIPPED | OUTPATIENT
Start: 2024-04-04

## 2024-04-04 RX ORDER — AMOXICILLIN 500 MG/1
500 CAPSULE ORAL EVERY 8 HOURS SCHEDULED
Qty: 30 CAPSULE | Refills: 0 | Status: SHIPPED | OUTPATIENT
Start: 2024-04-04 | End: 2024-04-14

## 2024-04-04 NOTE — ASSESSMENT & PLAN NOTE
Prescription for amoxicillin.  Recommended that he keep appointment with dentist as scheduled next week.

## 2024-04-04 NOTE — ASSESSMENT & PLAN NOTE
Recommended that patient follow through with vestibular therapy.  I provided him with phone number, address, and placed a new referral.  He may continue meclizine as needed.  He is not scheduled to see ENT until July.

## 2024-04-04 NOTE — PROGRESS NOTES
Assessment & Plan     1. Hospital discharge follow-up    2. Vertigo  Assessment & Plan:  Recommended that patient follow through with vestibular therapy.  I provided him with phone number, address, and placed a new referral.  He may continue meclizine as needed.  He is not scheduled to see ENT until July.    Orders:  -     meclizine (ANTIVERT) 25 mg tablet; Take 1 tablet (25 mg total) by mouth 3 (three) times a day as needed for dizziness  -     Ambulatory Referral to Physical Therapy; Future    3. Dental infection  Assessment & Plan:  Prescription for amoxicillin.  Recommended that he keep appointment with dentist as scheduled next week.    Orders:  -     amoxicillin (AMOXIL) 500 mg capsule; Take 1 capsule (500 mg total) by mouth every 8 (eight) hours for 10 days         Subjective     Transitional Care Management Review:   Jeffrey Vann is a 54 y.o. male here for TCM follow up.     During the TCM phone call patient stated:  TCM Call     Date and time call was made  3/26/2024  3:56 PM    Hospital care reviewed  Records reviewed    Patient was hospitialized at  [facility]    Date of Admission  03/25/24    Date of discharge  03/26/24    Diagnosis  vertigo    Disposition  Home    Were the patients medications reviewed and updated  No    Current Symptoms  None      TCM Call     Post hospital issues  None    Scheduled for follow up?  No  patient no showed for MACKENZIE    Patients specialists  --  ENT    Did you obtain your prescribed medications  Yes    Do you need help managing your prescriptions or medications  No    Comments  PATIENT NO SHOWED FOR THIS APPT        Jeffrey is a pleasant 54-year-old male who is here today for a follow-up from his recent hospitalization. He presented to the emergency room on 3/23/2024 complaining of dizziness.  He was tested for COVID/flu, which was negative and had blood work, which was unremarkable.  They completed a CTA of his head and neck, which did not show any intracranial  abnormalities.  He was discharged home with meclizine.  He reported back to the emergency room on 3/25/2024 with worsening symptoms.  He had an MRI of his head, which was negative for acute infarct.  Neurology was consulted, and did not recommend any further neurological workup or outpatient follow-up.  They recommended outpatient referral to ENT.  He was provided with a referral to vestibular therapy, but he has not been contacted to set it up.  He has been taking meclizine, but is not providing much relief.  He admits that he is still having severe dizziness, especially with position changes.    He is also concerned about a possible dental infection.  She did have an infected tooth, which was treated by his dentist with antibiotics and extracted.  He did notice that he is developing a bubble on his gum.  He does have a follow-up scheduled with his dentist next week.      Review of Systems   Constitutional:  Negative for chills, diaphoresis, fatigue and fever.   HENT:  Positive for dental problem. Negative for congestion, ear pain, postnasal drip, rhinorrhea, sneezing, sore throat and trouble swallowing.    Eyes:  Negative for pain and visual disturbance.   Respiratory:  Negative for apnea, cough, shortness of breath and wheezing.    Cardiovascular:  Negative for chest pain and palpitations.   Gastrointestinal:  Negative for abdominal pain, constipation, diarrhea, nausea and vomiting.   Genitourinary:  Negative for dysuria and hematuria.   Musculoskeletal:  Positive for gait problem (unsteady secondary to dizziness). Negative for arthralgias and myalgias.   Neurological:  Positive for dizziness. Negative for syncope, weakness, light-headedness, numbness and headaches.   Psychiatric/Behavioral:  Negative for suicidal ideas. The patient is not nervous/anxious.        Objective     /66   Pulse 94   Ht 6' (1.829 m)   Wt 92.1 kg (203 lb 1.6 oz)   SpO2 97%   BMI 27.55 kg/m²      Physical Exam  Vitals and  nursing note reviewed.   Constitutional:       General: He is not in acute distress.     Appearance: He is well-developed.   HENT:      Head: Normocephalic and atraumatic.      Right Ear: Tympanic membrane, ear canal and external ear normal. There is no impacted cerumen.      Left Ear: Tympanic membrane, ear canal and external ear normal. There is no impacted cerumen.      Nose: Nose normal. No congestion or rhinorrhea.      Mouth/Throat:      Dentition: Abnormal dentition.   Eyes:      Extraocular Movements: Extraocular movements intact.   Cardiovascular:      Rate and Rhythm: Normal rate and regular rhythm.      Heart sounds: No murmur heard.  Pulmonary:      Effort: Pulmonary effort is normal. No respiratory distress.      Breath sounds: Normal breath sounds.   Abdominal:      Palpations: Abdomen is soft.      Tenderness: There is no abdominal tenderness.   Musculoskeletal:         General: No swelling.      Cervical back: Neck supple.   Skin:     General: Skin is warm and dry.      Capillary Refill: Capillary refill takes less than 2 seconds.   Neurological:      Mental Status: He is alert.      Comments: Positive Mike-Hallpike maneuver on right side   Psychiatric:         Mood and Affect: Mood normal.         Behavior: Behavior normal.         Thought Content: Thought content normal.         Judgment: Judgment normal.       Medications have been reviewed by provider in current encounter    Zoila Wells PA-C

## 2024-04-17 ENCOUNTER — TELEPHONE (OUTPATIENT)
Dept: FAMILY MEDICINE CLINIC | Facility: CLINIC | Age: 54
End: 2024-04-17

## 2024-04-17 ENCOUNTER — EVALUATION (OUTPATIENT)
Dept: PHYSICAL THERAPY | Facility: HOME HEALTHCARE | Age: 54
End: 2024-04-17
Payer: COMMERCIAL

## 2024-04-17 DIAGNOSIS — R42 VERTIGO: Primary | ICD-10-CM

## 2024-04-17 PROCEDURE — 97162 PT EVAL MOD COMPLEX 30 MIN: CPT

## 2024-04-17 PROCEDURE — 97112 NEUROMUSCULAR REEDUCATION: CPT

## 2024-04-17 PROCEDURE — 95992 CANALITH REPOSITIONING PROC: CPT

## 2024-04-17 NOTE — PROGRESS NOTES
PT Evaluation     Today's date: 2024  Patient name: Jeffrey Vann  : 1970  MRN: [id number]  Referring provider: Zoila Wells PA-C  Dx:   Encounter Diagnosis     ICD-10-CM    1. Vertigo  R42 Ambulatory Referral to Physical Therapy          Start Time: 1045  Stop Time: 1125  Total time in clinic (min): 40 minutes    Assessment  Assessment details: Pt is a 55 y/o male presenting to OPPT with symptoms of dizziness consistent with diagnosis of plausible L and R posterior canal BPPV, L worse than R.  The pt tested positive for L Mike Hallpike and did have some improvement in symptoms following the Epley maneuver.  The pt's symptoms did not fully resolve, but could still be having some residual symptoms from the R canal.  The pt also had significant balance deficits, especially with a narrow base of support with eyes closed, which also replicated his dizziness symptoms.  The pt would benefit from continued PT in order to improve in balance, dizziness, functional mobility, quality of life, and return to PLOF.  Thank you.  Impairments: activity intolerance  Understanding of Dx/Px/POC: good   Prognosis: good    Goals  STG: 3-4 weeks  Pt will be independent with HEP in order to continue to progress outside of PT treatment sessions.  Pt will improve in quality of life as demonstrated by a 50% improvement in vertigo symptoms.  LT-8 weeks  Pt will improve in quality of life as demonstrated by ability to perform all ADLs without symptoms of dizziness.  Pt will improve in functional mobility as demonstrated by 50% improvement in resting posture.  Pt will improve in functional mobility as demonstrated by ability to roll in bed without symptoms of dizziness.  Pt will improve in functional mobility as demonstrated by ability to bend forward without vertigo symptoms.   Pt will improve in functional mobility as demonstrated by ability to perform full work duties.    Plan  Patient would benefit from: skilled physical  therapy  Planned therapy interventions: body mechanics training, canalith repositioning, flexibility, functional ROM exercises, gait training, home exercise program, joint mobilization, manual therapy, massage, neuromuscular re-education, patient education, postural training, strengthening, stretching, therapeutic activities and therapeutic exercise  Frequency: 2x week  Duration in weeks: 12  Plan of Care beginning date: 4/17/2024  Plan of Care expiration date: 7/10/2024  Treatment plan discussed with: patient      Subjective Evaluation    History of Present Illness  Mechanism of injury: The pt is presenting to OPPT with symptoms of dizziness, which has been ongoing since 03/21/2024.  He reports that he woke up that morning with the dizziness.  He initially thought it was from getting up too fast, but then the symptoms persisted throughout the day.  He reports that the dizziness symptoms are not as bad as they were, but that he still has symptoms, especially when standing an walking.  He describes the dizziness symptoms as feeling like he is drunk.  He reports that he is okay in the house, but feels off, especially when he is out of the house, such as in the supermarket.  He also reports that his anxiety and PTSD is worse, due to the dizziness.  He reports that he does have a spinning sensation when walking, when he gets up out of bed, walking, etc.  He reports that he did receive an MRI/CT scan, which was negative.  He also reports that he had the Mike Hallpike maneuver performed while he was in the ED and with his PCP to determine which side the vertigo was on.  He was prescribed meclozine, but did not take it today due to PT.   Patient Goals  Patient goals for therapy: improved balance  Patient goal: To get rid of the dizziness        Objective     Concurrent Complaints  Positive for tinnitus (intermittent). Negative for headaches, nausea/motion sickness, visual change, hearing loss, memory loss, poor  "concentration and peripheral neuropathy    Active Range of Motion   Cervical/Thoracic Spine       Cervical    Flexion:  WFL  Extension:  WFL  Left lateral flexion:  WFL  Right lateral flexion:  WFL  Left rotation:  WFL  Right rotation:  WFL    Functional Assessment        Comments  Romberg: EO: 30s, EC: 30s + dizzy  Tandem: EO: L: 30s, R: 30s, EC: L: <3s, R: <3s  Neuro Exam:     Dizziness  Positive for disequilibrium, vertigo, light-headedness, rocking or swaying (Standing/walking) and floating or swimming.   Negative for diplopia.     Exacerbating factors  Positive for bending over, rolling in bed, walking, turning head, supine to/from sitting and walking in busy environment.   Negative for looking up and optokinetic movement.     Symptoms   Intensity at best: 0/10  Intensity at worst: 10/10  Average intensity: 8/10    Headaches   Patient reports headaches: No.     Oculomotor exam   Oculomotor ROM: WNL  Resting nystagmus: not present     Positional testing   Mike-Hallpike   Left posterior canal: symptomatic, torsional and upbeating  Right posterior canal: symptomatic, torsional and upbeating             Precautions: None    Re-eval Date: 05/17/2024      Manuals 4/17                                                                Neuro Re-Ed             [place] schneider-pike (+) L>R            Epley L x3            VOR x1 Self paced x30\" UD and R/L no symptoms            VOR x2 Self Paced R/L x20\" w/ symptoms, U/D x20\" w/ symptoms            Tolbert daroff             Saccades (checkered board)             Ball to table             Amb w/ head turns             Amb w/ head nods             Romberg stance EO/EC 1x30\" ea            Tandem stance EO 1x30\" ea                         Ther Ex             Nustep-sx dump                                                                 Ther Activity                                       Gait Training                                       Modalities                                        "

## 2024-04-17 NOTE — TELEPHONE ENCOUNTER
I see that he is scheduled for therapy on 4/19/24, 4/22/24, and 4/24/24.     Lets keep him out one additional week and shoot for a return to work date of 4/28/24.     We can always adjust again if he feels better sooner or need to extend. Please have him update us on how therapy is going.     I put a new note in his chart.

## 2024-04-17 NOTE — TELEPHONE ENCOUNTER
He is requesting to have his note extended, he was to go back to work on the 21st,, and he is still going to therapy, and having the vertigo

## 2024-04-19 ENCOUNTER — OFFICE VISIT (OUTPATIENT)
Dept: PHYSICAL THERAPY | Facility: HOME HEALTHCARE | Age: 54
End: 2024-04-19
Payer: COMMERCIAL

## 2024-04-19 DIAGNOSIS — R42 VERTIGO: Primary | ICD-10-CM

## 2024-04-19 PROCEDURE — 97112 NEUROMUSCULAR REEDUCATION: CPT

## 2024-04-19 PROCEDURE — 95992 CANALITH REPOSITIONING PROC: CPT

## 2024-04-19 NOTE — PROGRESS NOTES
"Daily Note     Today's date: 2024  Patient name: Jeffrey Vann  : 1970  MRN: [id number]  Referring provider: Zoila Wells PA-C  Dx:   Encounter Diagnosis     ICD-10-CM    1. Vertigo  R42           Start Time: 745  Stop Time: 825  Total time in clinic (min): 40 minutes    Subjective: Pt reports that his dizziness is about the same as it was when he left on Wednesday.  He reports that when he walked to the clinic on Wednesday, he had to stop multiple times due to his balance feeling off.  He reports that today he was able to walk here without stopping due to the dizziness or balance.  He reports that the longer he walks, the more dizzy he feels.      Objective: See treatment diary below      Assessment: Pt tested positive again today for Mike Hallpike on L and R, with increased symptoms on the L side.  The pt responded well to Epley maneuver, which did help to improve his vertigo symptoms today during treatment session.  The pt also continues to have significant balance deficits, being most challenged with VOR x2 with ambulation.  It should be noted that the patient tends to lose balance to the L side and had difficulty performing R sided trunk leans for Biodex limits of stability and maze exercises.  The pt would benefit from continued PT.       Plan: Continue per plan of care.      Precautions: None    Re-eval Date: 2024      Manuals                                                                Neuro Re-Ed             [place] schneider-pike (+) L>R (+)  L>R           Epley L x3 Lx2 Rx1           VOR x1 Self paced x30\" UD and R/L no symptoms            VOR x2 Self Paced R/L x20\" w/ symptoms, U/D x20\" w/ symptoms Self Paced R/L x31\" w/ symptoms, U/D x33\" w/ symptoms           Tolbert ayse             Saccades (checkered board)             Ball to table             Amb w/ head turns  50' <> x2           Amb w/ head nods  50' <> x2           Romberg stance EO/EC 1x30\" ea Foam EO/EC 1x30\" ea      " "     Tandem stance EO 1x30\" ea Foam EO 2x30\" R/L  Firm EC 1x30\" R/L           Tandem amb on foam  <>x2 in p-bars           [place] on foam             Biodex LOS  0 HHA Easy Static 40% 0:51, 55% 0:42           Biodex Maze  0 HHA Easy Static 39% 0:55, 39% 0:55                        Ther Ex             Nustep-sx dump                                                                 Ther Activity                                       Gait Training                                       Modalities                                            "

## 2024-04-22 ENCOUNTER — OFFICE VISIT (OUTPATIENT)
Dept: PHYSICAL THERAPY | Facility: HOME HEALTHCARE | Age: 54
End: 2024-04-22
Payer: COMMERCIAL

## 2024-04-22 DIAGNOSIS — R42 VERTIGO: Primary | ICD-10-CM

## 2024-04-22 PROCEDURE — 97140 MANUAL THERAPY 1/> REGIONS: CPT

## 2024-04-22 PROCEDURE — 97112 NEUROMUSCULAR REEDUCATION: CPT

## 2024-04-22 NOTE — PROGRESS NOTES
"Daily Note     Today's date: 2024  Patient name: Jeffrey Vann  : 1970  MRN: [id number]  Referring provider: Zoila Wells PA-C  Dx:   Encounter Diagnosis     ICD-10-CM    1. Vertigo  R42           Start Time: 1300  Stop Time: 1353  Total time in clinic (min): 53 minutes    Subjective: Pt reports that his dizziness is so-so.  He reports that recently, he has been having a lot of tightness in his neck and at the base of his skull.      Objective: See treatment diary below      Assessment: Pt demonstrated good tolerance to treatment session, but does continue to have significant dizziness and balance deficits, especially when balancing with eyes closed.  The pt also complained of significant neck tightness, which has reported seemed to correlate with his symptoms of dizziness.  STM and SOR was added today, which did help to improve the patient's neck tightness and dizziness.  The pt would benefit from continued PT.      Plan: Continue per plan of care.      Precautions: None    Re-eval Date: 2024      Manuals           C/S distraction/PROM/SOR   JA          STM Robert oma JEAN                                    Neuro Re-Ed             Mike schneider-pike (+) L>R (+)  L>R           Epley L x3 Lx2 Rx1           VOR x1 Self paced x30\" UD and R/L no symptoms            VOR x2 Self Paced R/L x20\" w/ symptoms, U/D x20\" w/ symptoms Self Paced R/L x31\" w/ symptoms, U/D x33\" w/ symptoms Self Paced R/L x33\" w/ symptoms, U/D x23\" w/ symptoms          VOR x2 w/ checkered board   Self paced: R/L 25s, U/D: 43s          Tolbert maximilianooff             Saccades (checkered board)   Horizontal: 40s, Vertical: 40s          Ball to table             Amb w/ head turns  50' <> x2 50' <> x2          Amb w/ head nods  50' <> x2 50' <> x2          Romberg stance EO/EC 1x30\" ea Foam EO/EC 1x30\" ea Foam EO/EC 1x30\" ea          Tandem stance EO 1x30\" ea Foam EO 2x30\" R/L  Firm EC 1x30\" R/L Foam EO 2x30\" R/L  Firm EC " "1x30\" R/L          Tandem amb on foam  <>x2 in p-bars <>x2 in p-bars          Sidestepping on foam             Biodex LOS  0 HHA Easy Static 40% 0:51, 55% 0:42 0 HHA Easy Static 43% 0:48, 58% 0:40          Biodex Maze  0 HHA Easy Static 39% 0:55, 39% 0:55 0 HHA Easy Static 50% 0:43, -14% 0:50                       Ther Ex             Nustep-sx dump                                                                 Ther Activity                                       Gait Training                                       Modalities                                              "

## 2024-04-23 ENCOUNTER — OFFICE VISIT (OUTPATIENT)
Dept: FAMILY MEDICINE CLINIC | Facility: CLINIC | Age: 54
End: 2024-04-23
Payer: COMMERCIAL

## 2024-04-23 VITALS
BODY MASS INDEX: 27.55 KG/M2 | DIASTOLIC BLOOD PRESSURE: 60 MMHG | WEIGHT: 203.4 LBS | HEART RATE: 70 BPM | HEIGHT: 72 IN | SYSTOLIC BLOOD PRESSURE: 116 MMHG | OXYGEN SATURATION: 97 %

## 2024-04-23 DIAGNOSIS — R42 VERTIGO: Primary | ICD-10-CM

## 2024-04-23 PROCEDURE — 99213 OFFICE O/P EST LOW 20 MIN: CPT | Performed by: PHYSICIAN ASSISTANT

## 2024-04-23 NOTE — PROGRESS NOTES
Name: Jeffrey Vann      : 1970      MRN: [id number]  Encounter Provider: Zoila Wells PA-C  Encounter Date: 2024   Encounter department: [facility]    Assessment & Plan     1. Vertigo  Assessment & Plan:  Encouraged patient to continue vestibular therapy.  Symptoms are improving slowly.  Provided reassurance that this can take time.  He may continue meclizine as needed.  Follow-up with ENT in July as scheduled.             Subjective      Jeffrey is a pleasant 54-year-old male who is here today for a follow-up for vertigo.  He is currently attending vestibular therapy, which has been helping.  However, he still gets dizziness with sudden position changes.  He is also experiencing difficulty with his balance when trying to walk.  He was told by his therapist that it would be beneficial for him to continue physical therapy.  He has tried taking the meclizine as needed, but it does not provide much relief.  He needs an extension on his work excuse.  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      Review of Systems   Constitutional:  Negative for chills, diaphoresis, fatigue and fever.   HENT:  Negative for congestion, ear pain, postnasal drip, rhinorrhea, sneezing, sore throat and trouble swallowing.    Eyes:  Negative for pain and visual disturbance.   Respiratory:  Negative for apnea, cough, shortness of breath and wheezing.    Cardiovascular:  Negative for chest pain and palpitations.   Gastrointestinal:  Negative for abdominal pain, constipation, diarrhea, nausea and vomiting.   Genitourinary:  Negative for dysuria and hematuria.   Musculoskeletal:  Positive for gait problem. Negative for arthralgias and myalgias.   Neurological:  Positive for dizziness. Negative for syncope, weakness, light-headedness, numbness and headaches.   Psychiatric/Behavioral:  Negative for suicidal ideas. The patient is not nervous/anxious.        Current Outpatient Medications on  File Prior to Visit   Medication Sig   • meclizine (ANTIVERT) 25 mg tablet Take 1 tablet (25 mg total) by mouth 3 (three) times a day as needed for dizziness       Objective     /60   Pulse 70   Ht 6' (1.829 m)   Wt 92.3 kg (203 lb 6.4 oz)   SpO2 97%   BMI 27.59 kg/m²     Physical Exam  Vitals and nursing note reviewed.   Constitutional:       Appearance: He is well-developed.   HENT:      Head: Normocephalic and atraumatic.      Right Ear: Tympanic membrane, ear canal and external ear normal.      Left Ear: Tympanic membrane, ear canal and external ear normal.      Nose: Nose normal.      Mouth/Throat:      Pharynx: No oropharyngeal exudate or posterior oropharyngeal erythema.   Eyes:      Extraocular Movements: Extraocular movements intact.   Cardiovascular:      Rate and Rhythm: Normal rate and regular rhythm.      Heart sounds: Normal heart sounds. No murmur heard.     No friction rub. No gallop.   Pulmonary:      Effort: Pulmonary effort is normal. No respiratory distress.      Breath sounds: Normal breath sounds. No wheezing or rales.   Musculoskeletal:         General: Normal range of motion.      Cervical back: Normal range of motion and neck supple.   Lymphadenopathy:      Cervical: No cervical adenopathy.   Skin:     General: Skin is warm and dry.   Neurological:      Mental Status: He is alert and oriented to person, place, and time.   Psychiatric:         Behavior: Behavior normal.         Thought Content: Thought content normal.         Judgment: Judgment normal.       Zoila Wells PA-C

## 2024-04-23 NOTE — ASSESSMENT & PLAN NOTE
Encouraged patient to continue vestibular therapy.  Symptoms are improving slowly.  Provided reassurance that this can take time.  He may continue meclizine as needed.  Follow-up with ENT in July as scheduled.

## 2024-04-24 ENCOUNTER — OFFICE VISIT (OUTPATIENT)
Dept: PHYSICAL THERAPY | Facility: HOME HEALTHCARE | Age: 54
End: 2024-04-24
Payer: COMMERCIAL

## 2024-04-24 DIAGNOSIS — R42 VERTIGO: Primary | ICD-10-CM

## 2024-04-24 PROCEDURE — 97140 MANUAL THERAPY 1/> REGIONS: CPT

## 2024-04-24 PROCEDURE — 97112 NEUROMUSCULAR REEDUCATION: CPT

## 2024-04-24 NOTE — PROGRESS NOTES
"Daily Note     Today's date: 2024  Patient name: Jeffrey Vann  : 1970  MRN: [id number]  Referring provider: Zoila Wells PA-C  Dx:   Encounter Diagnosis     ICD-10-CM    1. Vertigo  R42           Start Time: 1207  Stop Time: 1300  Total time in clinic (min): 53 minutes    Subjective: Pt reports that he still feels dizzy at times, but that it is not as bad as it was.  He reports that had a follow-up with his MD, who cleared him to be off of work until May 12th that he would like to start PT 3 days per week until then.      Objective: See treatment diary below      Assessment: Pt demonstrated good tolerance to treatment session.  The pt had improved tolerance to horizontal VOR movements today, but does continue to have symptoms of dizziness with vertical VOR exercises.  He continues to respond well to STM for his c/s paraspinals, as there is likely a cervical component to his dizziness as well.  The pt would benefit from continued PT.      Plan: Continue per plan of care.      Precautions: None    Re-eval Date: 2024      Manuals          C/S distraction/PROM/SOR   MIRANDA JEAN         STM Robert scalematthew JEAN                                   Neuro Re-Ed             Mike schneider-pike (+) L>R (+)  L>R           Epley L x3 Lx2 Rx1           VOR x1 Self paced x30\" UD and R/L no symptoms            VOR x2 Self Paced R/L x20\" w/ symptoms, U/D x20\" w/ symptoms Self Paced R/L x31\" w/ symptoms, U/D x33\" w/ symptoms Self Paced R/L x33\" w/ symptoms, U/D x23\" w/ symptoms Self Paced R/L x55\" w/ symptoms, U/D x53\" w/ symptoms         VOR x2 w/ checkered board   Self paced: R/L 25s, U/D: 43s Self paced: R/L 56s, U/D: 1:23         Tolbert daroff             Saccades (checkered board)   Horizontal: 40s, Vertical: 40s Horizontal: 52s, Vertical: 64s         Ball to table             Amb w/ head turns  50' <> x2 50' <> x2 50' <> x2         Amb w/ head nods  50' <> x2 50' <> x2 50' <> x2         Romberg " "stance EO/EC 1x30\" ea Foam EO/EC 1x30\" ea Foam EO/EC 1x30\" ea Foam EO/EC 1x30\" ea         Tandem stance EO 1x30\" ea Foam EO 2x30\" R/L  Firm EC 1x30\" R/L Foam EO 2x30\" R/L  Firm EC 1x30\" R/L Foam EO 2x30\" R/L  Firm EC 1x30\" R/L         Tandem amb on foam  <>x2 in p-bars <>x2 in p-bars <>x2 in p-bars         Sidestepping on foam             Biodex LOS  0 HHA Easy Static 40% 0:51, 55% 0:42 0 HHA Easy Static 43% 0:48, 58% 0:40 0 HHA Easy Static 35% 0:53, 43% 0:45         Biodex Maze  0 HHA Easy Static 39% 0:55, 39% 0:55 0 HHA Easy Static 50% 0:43, -14% 0:50 0 HHA Easy Static 46% 0:40, 50% 0:35                      Ther Ex             Nustep-sx dump                                                                 Ther Activity                                       Gait Training                                       Modalities                                                "

## 2024-04-25 ENCOUNTER — TELEPHONE (OUTPATIENT)
Age: 54
End: 2024-04-25

## 2024-04-25 NOTE — TELEPHONE ENCOUNTER
Patient called stating he needs a note for [facility]. Patient asked for the return to work date be May 12, 2024.

## 2024-04-25 NOTE — TELEPHONE ENCOUNTER
Note placed in chart with expected return to work date. I completed his paperwork and faxed it this morning for [place].

## 2024-04-29 ENCOUNTER — OFFICE VISIT (OUTPATIENT)
Dept: PHYSICAL THERAPY | Facility: HOME HEALTHCARE | Age: 54
End: 2024-04-29
Payer: COMMERCIAL

## 2024-04-29 DIAGNOSIS — R42 VERTIGO: Primary | ICD-10-CM

## 2024-04-29 PROCEDURE — 97112 NEUROMUSCULAR REEDUCATION: CPT

## 2024-04-29 PROCEDURE — 97140 MANUAL THERAPY 1/> REGIONS: CPT
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
## 2024-05-01 ENCOUNTER — OFFICE VISIT (OUTPATIENT)
Dept: PHYSICAL THERAPY | Facility: HOME HEALTHCARE | Age: 54
End: 2024-05-01
Payer: COMMERCIAL

## 2024-05-01 DIAGNOSIS — R42 VERTIGO: Primary | ICD-10-CM

## 2024-05-01 PROCEDURE — 97112 NEUROMUSCULAR REEDUCATION: CPT

## 2024-05-01 NOTE — PROGRESS NOTES
"Daily Note     Today's date: 2024  Patient name: Jeffrey Vann  : 1970  MRN: [id number]  Referring provider: Zoila Wells PA-C  Dx:   Encounter Diagnosis     ICD-10-CM    1. Vertigo  R42           Start Time: 1215  Stop Time: 1255  Total time in clinic (min): 40 minutes    Subjective: Pt reports that he is feeling a little better today.      Objective: See treatment diary below      Assessment: Pt demonstrated improved tolerance to VOR and balance exercises today from previous session.  He does continue to be most challenged with VOR and ambulation.  The pt would benefit from continued PT.      Plan: Continue per plan of care.      Precautions: None    Re-eval Date: 2024      Manuals        C/S distraction/PROM/SOR   MIRANDA JEAN        STM Robert scalematthew JEAN                                  Neuro Re-Ed             [employer] schneider-pike (+) L>R (+)  L>R           Epley L x3 Lx2 Rx1           VOR x1 Self paced x30\" UD and R/L no symptoms            VOR x2 Self Paced R/L x20\" w/ symptoms, U/D x20\" w/ symptoms Self Paced R/L x31\" w/ symptoms, U/D x33\" w/ symptoms Self Paced R/L x33\" w/ symptoms, U/D x23\" w/ symptoms Self Paced R/L x55\" w/ symptoms, U/D x53\" w/ symptoms Self Paced R/L x31\" w/ symptoms, U/D x27\" w/ symptoms Self Paced R/L x48\" w/ symptoms, U/D x45\" w/ symptoms       VOR x2 w/ checkered board   Self paced: R/L 25s, U/D: 43s Self paced: R/L 56s, U/D: 1:23 Self paced: R/L 41s, U/D: 46s Self paced: R/L 1:38, U/D: 45s       Tolbert daroff             Saccades (checkered board)   Horizontal: 40s, Vertical: 40s Horizontal: 52s, Vertical: 64s Horizontal: 30s, Vertical: 30s Horizontal: 1:30, Vertical: 1:30       Ball to table             Amb w/ head turns  50' <> x2 50' <> x2 50' <> x2 50' <> x2 50' <> x2       Amb w/ head nods  50' <> x2 50' <> x2 50' <> x2 50' <> x2 50' <> x2       Romberg stance EO/EC 1x30\" ea Foam EO/EC 1x30\" ea Foam EO/EC 1x30\" ea Foam EO/EC 1x30\" ea " "Foam EO/EC 1x30\" ea Foam EO/EC 1x30\" ea       Tandem stance EO 1x30\" ea Foam EO 2x30\" R/L  Firm EC 1x30\" R/L Foam EO 2x30\" R/L  Firm EC 1x30\" R/L Foam EO 2x30\" R/L  Firm EC 1x30\" R/L Foam EO 1x30\" R/L  Firm EC 1x30\" R/L Foam EO 1x30\" R/L  Firm EC 1x30\" R/L       Tandem amb on foam  <>x2 in p-bars <>x2 in p-bars <>x2 in p-bars <>x2 in p-bars <>x2 in p-bars       Sidestepping on foam             Biodex LOS  0 HHA Easy Static 40% 0:51, 55% 0:42 0 HHA Easy Static 43% 0:48, 58% 0:40 0 HHA Easy Static 35% 0:53, 43% 0:45 0 HHA Easy Static 63% 0:35, 60% 0:35 0 HHA Easy Static 39% 0:51, 32% 0:48       Biodex Maze  0 HHA Easy Static 39% 0:55, 39% 0:55 0 HHA Easy Static 50% 0:43, -14% 0:50 0 HHA Easy Static 46% 0:40, 50% 0:35 0 HHA Easy Static 71% 0:35, 42% 0:35 0 HHA Easy Static 42% 0:42, 32% 0:40                    Ther Ex             Nustep-sx dump                                                                 Ther Activity                                       Gait Training                                       Modalities                                                    "

## 2024-05-03 ENCOUNTER — OFFICE VISIT (OUTPATIENT)
Dept: PHYSICAL THERAPY | Facility: HOME HEALTHCARE | Age: 54
End: 2024-05-03
Payer: COMMERCIAL

## 2024-05-03 DIAGNOSIS — R42 VERTIGO: Primary | ICD-10-CM

## 2024-05-03 PROCEDURE — 97112 NEUROMUSCULAR REEDUCATION: CPT

## 2024-05-03 PROCEDURE — 97140 MANUAL THERAPY 1/> REGIONS: CPT

## 2024-05-03 NOTE — PROGRESS NOTES
"Daily Note     Today's date: 5/3/2024  Patient name: Jeffrey Vann  : 1970  MRN: [id number]  Referring provider: Zoila Wells PA-C  Dx: No diagnosis found.    Start Time: 0747          Subjective: I notice an improvement in my balance but I wobble like I am drunk when I walk.     Objective: See treatment diary below    Assessment:  Pt carl session well and shows improvements in time and accuracy on Biodex machine. Pt reports reduced s/s with checkerboard and did well with faster head turns. Balance deficits noted when rounding corners in clinic during ambulation. Pt with cerv and UT tightness noted during STM and ROM. Patient would benefit from continued PT    Plan: Continue per plan of care.      Precautions: None    Re-eval Date: 2024      Manuals  5-3      C/S distraction/PROM/SOR   MIRANDA JEAN      STM Robert scalenes   MIRANDA JEAN                                Neuro Re-Ed       5-3      Mike schneider-pike (+) L>R (+)  L>R           Epley L x3 Lx2 Rx1           VOR x1 Self paced x30\" UD and R/L no symptoms            VOR x2 Self Paced R/L x20\" w/ symptoms, U/D x20\" w/ symptoms Self Paced R/L x31\" w/ symptoms, U/D x33\" w/ symptoms Self Paced R/L x33\" w/ symptoms, U/D x23\" w/ symptoms Self Paced R/L x55\" w/ symptoms, U/D x53\" w/ symptoms Self Paced R/L x31\" w/ symptoms, U/D x27\" w/ symptoms Self Paced R/L x48\" w/ symptoms, U/D x45\" w/ symptoms Self paced  W/symptoms  R/L  34\"  U/D  X35\"        VOR x2 w/ checkered board   Self paced: R/L 25s, U/D: 43s Self paced: R/L 56s, U/D: 1:23 Self paced: R/L 41s, U/D: 46s Self paced: R/L 1:38, U/D: 45s Self paced   R/L 42\"  U/D 48\"      Tolbert daroff             Saccades (checkered board)   Horizontal: 40s, Vertical: 40s Horizontal: 52s, Vertical: 64s Horizontal: 30s, Vertical: 30s Horizontal: 1:30, Vertical: 1:30 Horiz.  VOR1 42\"  VOR2 21\"  Vert  VOR1 57\"  VOR2 54\"      Ball to table             Amb w/ head turns  50' <> x2 50' <> x2 " "50' <> x2 50' <> x2 50' <> x2 50'  <>x2      Amb w/ head nods  50' <> x2 50' <> x2 50' <> x2 50' <> x2 50' <> x2 50'  <>x2      Romberg stance EO/EC 1x30\" ea Foam EO/EC 1x30\" ea Foam EO/EC 1x30\" ea Foam EO/EC 1x30\" ea Foam EO/EC 1x30\" ea Foam EO/EC 1x30\" ea Foam  EO/EC  1x30\" ea      Tandem stance EO 1x30\" ea Foam EO 2x30\" R/L  Firm EC 1x30\" R/L Foam EO 2x30\" R/L  Firm EC 1x30\" R/L Foam EO 2x30\" R/L  Firm EC 1x30\" R/L Foam EO 1x30\" R/L  Firm EC 1x30\" R/L Foam EO 1x30\" R/L  Firm EC 1x30\" R/L Foam  EO  1x30\"        Tandem amb on foam  <>x2 in p-bars <>x2 in p-bars <>x2 in p-bars <>x2 in p-bars <>x2 in p-bars <> x2  P-bars      Sidestepping on foam             Biodex LOS  0 HHA Easy Static 40% 0:51, 55% 0:42 0 HHA Easy Static 43% 0:48, 58% 0:40 0 HHA Easy Static 35% 0:53, 43% 0:45 0 HHA Easy Static 63% 0:35, 60% 0:35 0 HHA Easy Static 39% 0:51, 32% 0:48 0HHA  Easy/static  37\" 51%  34\" 69%      Biodex Maze  0 HHA Easy Static 39% 0:55, 39% 0:55 0 HHA Easy Static 50% 0:43, -14% 0:50 0 HHA Easy Static 46% 0:40, 50% 0:35 0 HHA Easy Static 71% 0:35, 42% 0:35 0 HHA Easy Static 42% 0:42, 32% 0:40 0HHA  Easy/static  36\" 92%  28\" 92%                   Ther Ex             Nustep-sx dump                                                                 Ther Activity                                       Gait Training                                       Modalities                                                      "

## 2024-05-06 ENCOUNTER — OFFICE VISIT (OUTPATIENT)
Dept: PHYSICAL THERAPY | Facility: HOME HEALTHCARE | Age: 54
End: 2024-05-06
Payer: COMMERCIAL

## 2024-05-06 DIAGNOSIS — R42 VERTIGO: Primary | ICD-10-CM

## 2024-05-06 PROCEDURE — 97140 MANUAL THERAPY 1/> REGIONS: CPT

## 2024-05-06 PROCEDURE — 97112 NEUROMUSCULAR REEDUCATION: CPT

## 2024-05-06 NOTE — PROGRESS NOTES
"Daily Note     Today's date: 2024  Patient name: Jeffrey Vann  : 1970  MRN: [id number]  Referring provider: Zoila Wells PA-C  Dx:   Encounter Diagnosis     ICD-10-CM    1. Vertigo  R42           Start Time: 1117  Stop Time: 1201  Total time in clinic (min): 44 minutes    Subjective: Pt reports that he doesn't feel too dizzy today, just a little lightheaded.  He reports that getting out of the house helps.      Objective: See treatment diary below      Assessment: Pt demonstrated good tolerance to treatment session and was able to progress with balance exercises.  The pt was able to perform balance and VOR exercises today with minimal symptoms, which is an improvement from previous session.  The pt would benefit from continued PT.      Plan: Continue per plan of care.      Precautions: None    Re-eval Date: 2024      Manuals  5-3      C/S distraction/PROM/SOR   MIRANDA JEAN     STM Robert scalenes   MIRANDA JEAN                               Neuro Re-Ed       5-3      Mike schneider-pike (+) L>R (+)  L>R           Epley L x3 Lx2 Rx1           VOR x1 Self paced x30\" UD and R/L no symptoms            VOR x2 Self Paced R/L x20\" w/ symptoms, U/D x20\" w/ symptoms Self Paced R/L x31\" w/ symptoms, U/D x33\" w/ symptoms Self Paced R/L x33\" w/ symptoms, U/D x23\" w/ symptoms Self Paced R/L x55\" w/ symptoms, U/D x53\" w/ symptoms Self Paced R/L x31\" w/ symptoms, U/D x27\" w/ symptoms Self Paced R/L x48\" w/ symptoms, U/D x45\" w/ symptoms Self paced  W/symptoms  R/L  34\"  U/D  X35\"   Self paced  W/symptoms  R/L  39\"  U/D  X35\"     VOR x2 w/ checkered board   Self paced: R/L 25s, U/D: 43s Self paced: R/L 56s, U/D: 1:23 Self paced: R/L 41s, U/D: 46s Self paced: R/L 1:38, U/D: 45s Self paced   R/L 42\"  U/D 48\" Self paced   R/L 1:30  U/D 2:00     Tomasz tavera             Saccades (checkered board)   Horizontal: 40s, Vertical: 40s Horizontal: 52s, Vertical: 64s Horizontal: 30s, " "Vertical: 30s Horizontal: 1:30, Vertical: 1:30 Horiz.  VOR1 42\"  VOR2 21\"  Vert  VOR1 57\"  VOR2 54\" Horizontal: 1:27, Vertical: 2:00     Ball to table             Amb w/ head turns  50' <> x2 50' <> x2 50' <> x2 50' <> x2 50' <> x2 50'  <>x2 50'  <>x2     Amb w/ head nods  50' <> x2 50' <> x2 50' <> x2 50' <> x2 50' <> x2 50'  <>x2 50'  <>x2     Romberg stance EO/EC 1x30\" ea Foam EO/EC 1x30\" ea Foam EO/EC 1x30\" ea Foam EO/EC 1x30\" ea Foam EO/EC 1x30\" ea Foam EO/EC 1x30\" ea Foam  EO/EC  1x30\" ea Foam  EO 1x30\" EC  2x30\"      Tandem stance EO 1x30\" ea Foam EO 2x30\" R/L  Firm EC 1x30\" R/L Foam EO 2x30\" R/L  Firm EC 1x30\" R/L Foam EO 2x30\" R/L  Firm EC 1x30\" R/L Foam EO 1x30\" R/L  Firm EC 1x30\" R/L Foam EO 1x30\" R/L  Firm EC 1x30\" R/L Foam  EO  1x30\"   Foam EO 1x30\" R/L  Firm EC 1x30\" R/L     Tandem amb on foam  <>x2 in p-bars <>x2 in p-bars <>x2 in p-bars <>x2 in p-bars <>x2 in p-bars <> x2  P-bars <> x3 P-bars     Sidestepping on foam             Biodex LOS  0 HHA Easy Static 40% 0:51, 55% 0:42 0 HHA Easy Static 43% 0:48, 58% 0:40 0 HHA Easy Static 35% 0:53, 43% 0:45 0 HHA Easy Static 63% 0:35, 60% 0:35 0 HHA Easy Static 39% 0:51, 32% 0:48 0HHA  Easy/static  37\" 51%  34\" 69% 0HHA  Med/static  39\" 55%  48\" 49%     Biodex Maze  0 HHA Easy Static 39% 0:55, 39% 0:55 0 HHA Easy Static 50% 0:43, -14% 0:50 0 HHA Easy Static 46% 0:40, 50% 0:35 0 HHA Easy Static 71% 0:35, 42% 0:35 0 HHA Easy Static 42% 0:42, 32% 0:40 0HHA  Easy/static  36\" 92%  28\" 92% 0HHA  Med/static  1:13 -24%  1:24 -46%                  Ther Ex             Nustep-sx dump                                                                 Ther Activity                                       Gait Training                                       Modalities                                                        "

## 2024-05-08 ENCOUNTER — OFFICE VISIT (OUTPATIENT)
Dept: PHYSICAL THERAPY | Facility: HOME HEALTHCARE | Age: 54
End: 2024-05-08
Payer: COMMERCIAL

## 2024-05-08 DIAGNOSIS — R42 VERTIGO: Primary | ICD-10-CM

## 2024-05-08 PROCEDURE — 97112 NEUROMUSCULAR REEDUCATION: CPT

## 2024-05-08 NOTE — PROGRESS NOTES
"Daily Note     Today's date: 2024  Patient name: Jeffrey Vann  : 1970  MRN: [id number]  Referring provider: Zoila Wells PA-C  Dx:   Encounter Diagnosis     ICD-10-CM    1. Vertigo  R42           Start Time: 1130  Stop Time: 1210  Total time in clinic (min): 40 minutes    Subjective: Pt reports that he is so-so today.  He reports that the dizziness is lingering a little, but that it is a lot better than it was.       Objective: See treatment diary below      Assessment: Pt demonstrated good tolerance to treatment session.  He does continue to have light headed symptoms when performing VOR exercises, but has been progressively improving.  The pt would benefit from continued PT.      Plan: Continue per plan of care.      Precautions: None    Re-eval Date: 2024      Manuals  5-3 5    C/S distraction/PROM/SOR   MIRANDA JEAN    STM Robert scalenes   MIRANDA JEAN                              Neuro Re-Ed       5-3      Mike schneider-pike (+) L>R (+)  L>R           Epley L x3 Lx2 Rx1           VOR x1 Self paced x30\" UD and R/L no symptoms            VOR x2 Self Paced R/L x20\" w/ symptoms, U/D x20\" w/ symptoms Self Paced R/L x31\" w/ symptoms, U/D x33\" w/ symptoms Self Paced R/L x33\" w/ symptoms, U/D x23\" w/ symptoms Self Paced R/L x55\" w/ symptoms, U/D x53\" w/ symptoms Self Paced R/L x31\" w/ symptoms, U/D x27\" w/ symptoms Self Paced R/L x48\" w/ symptoms, U/D x45\" w/ symptoms Self paced  W/symptoms  R/L  34\"  U/D  X35\"   Self paced  W/symptoms  R/L  39\"  U/D  X35\" Self paced  W/symptoms  R/L  41\"  U/D  X52\"    VOR x2 w/ checkered board   Self paced: R/L 25s, U/D: 43s Self paced: R/L 56s, U/D: 1:23 Self paced: R/L 41s, U/D: 46s Self paced: R/L 1:38, U/D: 45s Self paced   R/L 42\"  U/D 48\" Self paced   R/L 1:30  U/D 2:00 Self paced   R/L 55s  U/D 55s    Tomasz tavera             Saccades (checkered board)   Horizontal: 40s, Vertical: 40s Horizontal: 52s, Vertical: 64s " "Horizontal: 30s, Vertical: 30s Horizontal: 1:30, Vertical: 1:30 Horiz.  VOR1 42\"  VOR2 21\"  Vert  VOR1 57\"  VOR2 54\" Horizontal: 1:27, Vertical: 2:00 Horizontal: 1:00, Vertical: 58s    Ball to table             Amb w/ head turns  50' <> x2 50' <> x2 50' <> x2 50' <> x2 50' <> x2 50'  <>x2 50'  <>x2 50'  <>x2    Amb w/ head nods  50' <> x2 50' <> x2 50' <> x2 50' <> x2 50' <> x2 50'  <>x2 50'  <>x2 50'  <>x2    Romberg stance EO/EC 1x30\" ea Foam EO/EC 1x30\" ea Foam EO/EC 1x30\" ea Foam EO/EC 1x30\" ea Foam EO/EC 1x30\" ea Foam EO/EC 1x30\" ea Foam  EO/EC  1x30\" ea Foam  EO 1x30\" EC  2x30\"  Foam  EO 1x30\" EC  2x30\"     Tandem stance EO 1x30\" ea Foam EO 2x30\" R/L  Firm EC 1x30\" R/L Foam EO 2x30\" R/L  Firm EC 1x30\" R/L Foam EO 2x30\" R/L  Firm EC 1x30\" R/L Foam EO 1x30\" R/L  Firm EC 1x30\" R/L Foam EO 1x30\" R/L  Firm EC 1x30\" R/L Foam  EO  1x30\"   Foam EO 1x30\" R/L  Firm EC 1x30\" R/L Foam EO 1x30\" R/L  Firm EC 1x30\" R/L    Tandem amb on foam  <>x2 in p-bars <>x2 in p-bars <>x2 in p-bars <>x2 in p-bars <>x2 in p-bars <> x2  P-bars <> x3 P-bars <> x3 P-bars    Sidestepping on foam             Biodex LOS  0 HHA Easy Static 40% 0:51, 55% 0:42 0 HHA Easy Static 43% 0:48, 58% 0:40 0 HHA Easy Static 35% 0:53, 43% 0:45 0 HHA Easy Static 63% 0:35, 60% 0:35 0 HHA Easy Static 39% 0:51, 32% 0:48 0HHA  Easy/static  37\" 51%  34\" 69% 0HHA  Med/static  39\" 55%  48\" 49% 0HHA  Med/static  42\" 58%  42\" 70%    Biodex Maze  0 HHA Easy Static 39% 0:55, 39% 0:55 0 HHA Easy Static 50% 0:43, -14% 0:50 0 HHA Easy Static 46% 0:40, 50% 0:35 0 HHA Easy Static 71% 0:35, 42% 0:35 0 HHA Easy Static 42% 0:42, 32% 0:40 0HHA  Easy/static  36\" 92%  28\" 92% 0HHA  Med/static  1:13 -24%  1:24 -46% 0HHA  Med/static  0:55 50%  1:01 48%                 Ther Ex             Nustep-sx dump                                                                 Ther Activity                                       Gait Training                                       Modalities      "

## 2024-05-13 ENCOUNTER — OFFICE VISIT (OUTPATIENT)
Dept: PHYSICAL THERAPY | Facility: HOME HEALTHCARE | Age: 54
End: 2024-05-13
Payer: COMMERCIAL

## 2024-05-13 DIAGNOSIS — R42 VERTIGO: Primary | ICD-10-CM

## 2024-05-13 PROCEDURE — 97112 NEUROMUSCULAR REEDUCATION: CPT

## 2024-05-13 PROCEDURE — 97140 MANUAL THERAPY 1/> REGIONS: CPT

## 2024-05-13 NOTE — PROGRESS NOTES
"Daily Note     Today's date: 2024  Patient name: Jeffrey Vann  : 1970  MRN: [id number]  Referring provider: Zoila Wells PA-C  Dx:   Encounter Diagnosis     ICD-10-CM    1. Vertigo  R42           Start Time: 1110  Stop Time: 1151  Total time in clinic (min): 41 minutes    Subjective: Pt reports that yesterday was his first day back to work.  He reports that he did feel the room spinning when walking at work, but otherwise was okay.      Objective: See treatment diary below      Assessment: Pt demonstrated good tolerance to treatment session and was able to progress with VOR and balance exercises.  He did have some symptoms with VOR exercises, but demonstrated improved performance on exercises today and less reported symptoms than previous session.  The pt would benefit from continued PT.      Plan: Continue per plan of care.      Precautions: None    Re-eval Date: 2024      Manuals  5-3    C/S distraction/PROM/SOR   MIRANDA JEAN   STM Robert scalenes   MIRANDA JEAN                             Neuro Re-Ed       5-3      Mike schneider-pike (+) L>R (+)  L>R           Epley L x3 Lx2 Rx1           VOR x1 Self paced x30\" UD and R/L no symptoms            VOR x2 Self Paced R/L x20\" w/ symptoms, U/D x20\" w/ symptoms Self Paced R/L x31\" w/ symptoms, U/D x33\" w/ symptoms Self Paced R/L x33\" w/ symptoms, U/D x23\" w/ symptoms Self Paced R/L x55\" w/ symptoms, U/D x53\" w/ symptoms Self Paced R/L x31\" w/ symptoms, U/D x27\" w/ symptoms Self Paced R/L x48\" w/ symptoms, U/D x45\" w/ symptoms Self paced  W/symptoms  R/L  34\"  U/D  X35\"   Self paced  W/symptoms  R/L  39\"  U/D  X35\" Self paced  W/symptoms  R/L  41\"  U/D  X52\" Self paced  W/symptoms  R/L  54\"  U/D  1:44   VOR x2 w/ checkered board   Self paced: R/L 25s, U/D: 43s Self paced: R/L 56s, U/D: 1:23 Self paced: R/L 41s, U/D: 46s Self paced: R/L 1:38, U/D: 45s Self paced   R/L 42\"  U/D 48\" Self paced   R/L " "1:30  U/D 2:00 Self paced   R/L 55s  U/D 55s Self paced   R/L 1:18  U/D 59   Tolbert daroff             Saccades (checkered board)   Horizontal: 40s, Vertical: 40s Horizontal: 52s, Vertical: 64s Horizontal: 30s, Vertical: 30s Horizontal: 1:30, Vertical: 1:30 Horiz.  VOR1 42\"  VOR2 21\"  Vert  VOR1 57\"  VOR2 54\" Horizontal: 1:27, Vertical: 2:00 Horizontal: 1:00, Vertical: 58s Horizontal: 50s, Vertical: 52s   Ball to table             Amb w/ head turns  50' <> x2 50' <> x2 50' <> x2 50' <> x2 50' <> x2 50'  <>x2 50'  <>x2 50'  <>x2 50'  <>x2   Amb w/ head nods  50' <> x2 50' <> x2 50' <> x2 50' <> x2 50' <> x2 50'  <>x2 50'  <>x2 50'  <>x2 50'  <>x2   Romberg stance EO/EC 1x30\" ea Foam EO/EC 1x30\" ea Foam EO/EC 1x30\" ea Foam EO/EC 1x30\" ea Foam EO/EC 1x30\" ea Foam EO/EC 1x30\" ea Foam  EO/EC  1x30\" ea Foam  EO 1x30\" EC  2x30\"  Foam  EO 1x30\" EC  2x30\"  Foam  EO 1x30\" EC  1x45\"    Tandem stance EO 1x30\" ea Foam EO 2x30\" R/L  Firm EC 1x30\" R/L Foam EO 2x30\" R/L  Firm EC 1x30\" R/L Foam EO 2x30\" R/L  Firm EC 1x30\" R/L Foam EO 1x30\" R/L  Firm EC 1x30\" R/L Foam EO 1x30\" R/L  Firm EC 1x30\" R/L Foam  EO  1x30\"   Foam EO 1x30\" R/L  Firm EC 1x30\" R/L Foam EO 1x30\" R/L  Firm EC 1x30\" R/L Foam EO 1x30\" R/L  Firm EC 1x30\" R/L   Tandem amb on foam  <>x2 in p-bars <>x2 in p-bars <>x2 in p-bars <>x2 in p-bars <>x2 in p-bars <> x2  P-bars <> x3 P-bars <> x3 P-bars <> x3 P-bars   Sidestepping on foam             Biodex LOS  0 HHA Easy Static 40% 0:51, 55% 0:42 0 HHA Easy Static 43% 0:48, 58% 0:40 0 HHA Easy Static 35% 0:53, 43% 0:45 0 HHA Easy Static 63% 0:35, 60% 0:35 0 HHA Easy Static 39% 0:51, 32% 0:48 0HHA  Easy/static  37\" 51%  34\" 69% 0HHA  Med/static  39\" 55%  48\" 49% 0HHA  Med/static  42\" 58%  42\" 70% 0HHA  Med/static  40\" 69%  44\" 67%   Biodex Maze  0 HHA Easy Static 39% 0:55, 39% 0:55 0 HHA Easy Static 50% 0:43, -14% 0:50 0 HHA Easy Static 46% 0:40, 50% 0:35 0 HHA Easy Static 71% 0:35, 42% 0:35 0 HHA Easy Static 42% 0:42, 32% " "0:40 0HHA  Easy/static  36\" 92%  28\" 92% 0HHA  Med/static  1:13 -24%  1:24 -46% 0HHA  Med/static  0:55 50%  1:01 48% 0HHA  Med/static  0:54 72%  0:59 38%                Ther Ex             Nustep-sx dump                                                                 Ther Activity                                       Gait Training                                       Modalities                                                            "

## 2024-07-15 ENCOUNTER — OFFICE VISIT (OUTPATIENT)
Dept: OTOLARYNGOLOGY | Facility: CLINIC | Age: 54
End: 2024-07-15
Payer: COMMERCIAL

## 2024-07-15 ENCOUNTER — OFFICE VISIT (OUTPATIENT)
Dept: AUDIOLOGY | Facility: CLINIC | Age: 54
End: 2024-07-15
Payer: COMMERCIAL

## 2024-07-15 VITALS
HEART RATE: 64 BPM | WEIGHT: 202 LBS | OXYGEN SATURATION: 96 % | SYSTOLIC BLOOD PRESSURE: 126 MMHG | TEMPERATURE: 97.9 F | HEIGHT: 72 IN | BODY MASS INDEX: 27.36 KG/M2 | DIASTOLIC BLOOD PRESSURE: 88 MMHG | RESPIRATION RATE: 18 BRPM

## 2024-07-15 DIAGNOSIS — R42 LIGHTHEADEDNESS: ICD-10-CM

## 2024-07-15 DIAGNOSIS — H90.3 SENSORY HEARING LOSS, BILATERAL: Primary | ICD-10-CM

## 2024-07-15 DIAGNOSIS — Z01.10 NORMAL HEARING EXAM: ICD-10-CM

## 2024-07-15 DIAGNOSIS — R42 VERTIGO: Primary | ICD-10-CM

## 2024-07-15 PROCEDURE — 92567 TYMPANOMETRY: CPT | Performed by: AUDIOLOGIST-HEARING AID FITTER

## 2024-07-15 PROCEDURE — 99204 OFFICE O/P NEW MOD 45 MIN: CPT | Performed by: PHYSICIAN ASSISTANT

## 2024-07-15 PROCEDURE — 92557 COMPREHENSIVE HEARING TEST: CPT | Performed by: AUDIOLOGIST-HEARING AID FITTER

## 2024-07-15 NOTE — PROGRESS NOTES
Review of Systems   Constitutional: Negative.    HENT:  Positive for ear pain (inside and the back part of the ear.).    Eyes: Negative.    Respiratory: Negative.     Cardiovascular: Negative.    Gastrointestinal: Negative.    Endocrine: Negative.    Genitourinary: Negative.    Musculoskeletal: Negative.    Skin: Negative.    Allergic/Immunologic: Negative.    Neurological:  Positive for dizziness.   Hematological: Negative.    Psychiatric/Behavioral: Negative.

## 2024-07-15 NOTE — PROGRESS NOTES
ENT HEARING EVALUATION    Name:  Jeffrey Vann  :  1970  Age:  54 y.o.   MRN:  [id number]  Date of Evaluation: 07/15/24     HISTORY:    Reason for visit: Dizziness    Jeffrey Vann is being seen today for an evaluation of hearing as part of their ENT visit.   EVALUATION:    Otoscopy was completed during ENT visit.    Tympanometry:   Right Ear: Type A; normal middle ear pressure and static compliance    Left Ear: Type A; normal middle ear pressure and static compliance     Speech Audiometry:  Speech Reception (SRT)   Right Ear: 25 dB HL   Left Ear: 25 dB HL    Word Recognition Scores (WRS):  Right Ear: excellent (100 % correct)     Left Ear: excellent (100 % correct)   Stimuli: [id number]    Pure Tone Audiometry:  Conventional pure tone audiometry from 250 - 8000 Hz  was obtained with good reliability and revealed the following:     Right Ear: Normal hearing sensitivity   Left Ear: Normal hearing sensitivity      *see attached audiogram    IMPRESSIONS:   Normal hearing     RECOMMENDATIONS:  Consult ENT      Ros Butler, CCC-A  Clinical Audiologist

## 2024-07-15 NOTE — PROGRESS NOTES
[facility] Otolaryngology New Patient visit      Jeffrey Vann is a 54 y.o. male who presents with a chief complaint of vertigo     Independent historian: none      Pertinent elements of the history:  H/o vertigo started in March  3 weeks prior to ED visit, he had a sinus infection   He started having a spinning sensation thereafter    Helped when he sat still, but symptomatic with movement   Triggered by turning in bed, left side, or turning his head quickly from side to side     Ears were feeling clogged at that time Left>Right   No associated tinnitus     Underwent PT and completed in May  Returned to work    Overall, symptoms are much better since onset  Improvement in balance  Before, he could barely walk    He still has some mild symptoms when he gets up real fast or stands  Goes away in a few seconds/minutes  Occasional lightheadedness/fuzziness   Overall, he is unbothered    Antivert PRN  Does his home PT exercises     Some pain in the occipital region or if he puts his finger in his ear into neck    Has been told he grinds his teeth in his sleep     No ear issues in the past  No otorrhea   No otologic surgery   No other ear history  No falls  No diabetes, HTN  No migraine hx, but does get tension headaches  No cervical issue   No visual changes    Works as a inventory supervisor- stressful job  Noisy       Review of any relevant imaging: images from any scan reviewed personally  Scans:   MRI brain wo contrast 03/26/24:  IMPRESSION: No acute infarction, edema, or mass effect. Few punctate hyperintense T2/FLAIR foci are noted within the periventricular and subcortical white matter which are nonspecific and can be seen with vasculitis, migrainous angiopathy, or precocious microangiopathic changes.       CTA head/neck 03/23/24:  IMPRESSION:     1.  No acute intracranial hemorrhage, mass effect or extra-axial collection.   2. Focal filling defect in the left V4 vertebral artery segment concerning for either a  short segment arterial dissection or vascular web.   3.  No intracranial aneurysm.  No hemodynamically significant stenosis or occlusion of the major vessels of the [place].   ADDENDUM: Findings in the left V4 segment vertebral artery is more likely to represent an incidental vascular anomaly such as a web or fenestration and less likely to reflect a dissection.  Per 03/23/24 ED note @2223  Stroke neurologist feels that the CTA finding may represent artifact.     Personal review: mastoid bones are overall  clear. Right mastoid with mild singular air cell opacification.    Labs: CBC, CMP, TSH  Notes: ED 03/23/24 with admit, PT notes Mike Jaycepike (+) April x 2, lightheadedness with VOR exercises     Review of Systems:  As above    PMHx:  Past Medical History:   Diagnosis Date    Asthma     Disease of thyroid gland     PTSD (post-traumatic stress disorder)         FAMHx:  Family History   Problem Relation Age of Onset    Diabetes Father     Asthma Father        SOCHx:  Social History     Socioeconomic History    Marital status: /Civil Union     Spouse name: None    Number of children: None    Years of education: None    Highest education level: None   Occupational History    None   Tobacco Use    Smoking status: Some Days     Types: Cigarettes    Smokeless tobacco: Never   Vaping Use    Vaping status: Never Used   Substance and Sexual Activity    Alcohol use: Not Currently    Drug use: Never    Sexual activity: Not Currently   Other Topics Concern    None   Social History Narrative    None     Social Determinants of Health     Financial Resource Strain: Not on file   Food Insecurity: Not on file   Transportation Needs: Not on file   Physical Activity: Not on file   Stress: Not on file   Social Connections: Not on file   Intimate Partner Violence: Not on file   Housing Stability: Not on file       Allergies:  No Known Allergies     MEDS:  Reviewed      Physical exam: (abnormal findings appear in bold and  supercede any conflicting normal findings listed below)    /88   Pulse 64   Temp 97.9 °F (36.6 °C) (Temporal)   Resp 18   Ht 6' (1.829 m)   Wt 91.6 kg (202 lb)   SpO2 96%   BMI 27.40 kg/m²     Constitutional:  Well developed, well nourished and groomed, in no acute distress.     Eyes:  Extra-ocular movements intact, pupils equally round and reactive to light and accommodation, the lids and conjunctivae are normal in appearance.    Head: Atraumatic, normocephalic, no visible scalp lesions, bony palpation unremarkable without stepoffs, parotid and submandibular salivary glands non-tender to palpation and without masses bilaterally.     Ears:  Auricles normal in appearance bilaterally, mastoid prominence non-tender, external auditory canals clear bilaterally, tympanic membranes intact bilaterally without evidence of middle ear effusion or masses, normal appearing ossicles. [place] Hallpike (-)    Nose/Sinuses:  External appearance unremarkable, no maxillary or frontal sinus tenderness to palpation bilaterally. Anterior rhinoscopy demonstrates pink mucosa. No polyps or other masses identified. Turbinates are non-edematous. No evidence of purulent drainage.    Oral Cavity:  Moist mucus membranes, gums and dentition unremarkable, no oral mucosal masses or lesions, floor of mouth soft, tongue mobile without masses or lesions.     Oropharynx:  Base of tongue soft and without masses, tonsils bilaterally unremarkable, soft palate mucosa unremarkable.     Neck:  No visible or palpable cervical lesions or lymphadenopathy, thyroid gland is normal in size and symmetry and without masses, normal laryngeal elevation with swallowing.     Cardiovascular:  Normal rate and rhythm, no palpable thrills, no jugulovenous distension observed.  Respiratory:  Normal respiratory effort without evidence of retractions or use of accessory muscles.  Integument:  Normal appearing without observed masses or lesions.  Neurologic:  Cranial  nerves II-XII intact bilaterally.  Psychiatric:  Alert and oriented to time, place and person, normal affect.      Procedure:      Audiometry:  Audiogram ordered and reviewed with patient and audiologist. Normal hearing in all frequencies. Word recognition 100% at 65 dB. Tympanograms type A bilaterally.        Assessment:  1. Vertigo  Ambulatory Referral to Otolaryngology      2. Lightheadedness        3. Normal hearing exam            Plan:  1. Jeffrey Vann is a 54 y.o. male with acute and chronic problems as above who presents for evaluation of vertigo beginning in March.  He did have a CTA after presenting to the ED for his symptoms, findings a defect in the left V4 vertebral artery segment. Neurology consult felt this was representative of artifact. He also had an MRI brain wo IVC which showed demonstrated nonspecific findings. Underwent and completed vestibular therapy with improvement in symptoms. He was treated twice for BPPV-Left ear. Still has some intermittent periods of lightheadedness/vertigo.    [place] Hallpike (-)  Ears clear bilaterally  Hearing is within normal range in all frequencies. Hearing protection recommended in loud environments to help preserve hearing.     Discussed vertigo in detail and and possible otologic etiologies. Symptoms in March seemed to co-inside with BPPV that was treated with vestibular therapy.     We discussed other causes of vertigo as well as additional work up and treatment if his symptoms were to return, worsen, or become associated with tinnitus and hearing loss. Discussed MRI brain w IAC, VNG. May return to vestibular therapy PRN    Meclizine PRN. Cautioned that daily use can cause worsening symptoms since this is a vestibular suppressant.     He sometimes has pain  if he puts his finger in his left ear, in the occipital region or into neck. Could be musculoskeletal in nature TMJ disorder (he has a history of bruxism) vs. Cervical spine process.     Follow up in 6  "months.         ** Please Note: Portions of the record may have been created with voice recognition software. Occasional wrong word or \"sound a like\" substitutions may have occurred due to the inherent limitations of voice recognition software. There may also be notations and random deletions of words or characters from malfunctioning software. Read the chart carefully and recognize, using context, where substitutions/deletions have occurred.**    "

## 2024-10-02 ENCOUNTER — TELEPHONE (OUTPATIENT)
Age: 54
End: 2024-10-02

## 2024-10-02 ENCOUNTER — OFFICE VISIT (OUTPATIENT)
Dept: FAMILY MEDICINE CLINIC | Facility: CLINIC | Age: 54
End: 2024-10-02
Payer: COMMERCIAL

## 2024-10-02 VITALS
WEIGHT: 201.4 LBS | HEART RATE: 75 BPM | OXYGEN SATURATION: 98 % | HEIGHT: 72 IN | SYSTOLIC BLOOD PRESSURE: 140 MMHG | BODY MASS INDEX: 27.28 KG/M2 | DIASTOLIC BLOOD PRESSURE: 82 MMHG

## 2024-10-02 DIAGNOSIS — F41.9 ANXIETY: ICD-10-CM

## 2024-10-02 DIAGNOSIS — Z23 NEED FOR INFLUENZA VACCINATION: ICD-10-CM

## 2024-10-02 DIAGNOSIS — R42 VERTIGO: Primary | ICD-10-CM

## 2024-10-02 DIAGNOSIS — F43.10 PTSD (POST-TRAUMATIC STRESS DISORDER): ICD-10-CM

## 2024-10-02 DIAGNOSIS — F32.A DEPRESSION, UNSPECIFIED DEPRESSION TYPE: ICD-10-CM

## 2024-10-02 PROCEDURE — 90673 RIV3 VACCINE NO PRESERV IM: CPT | Performed by: PHYSICIAN ASSISTANT

## 2024-10-02 PROCEDURE — 90471 IMMUNIZATION ADMIN: CPT | Performed by: PHYSICIAN ASSISTANT

## 2024-10-02 PROCEDURE — 99214 OFFICE O/P EST MOD 30 MIN: CPT | Performed by: PHYSICIAN ASSISTANT

## 2024-10-02 RX ORDER — MECLIZINE HYDROCHLORIDE 25 MG/1
25 TABLET ORAL 3 TIMES DAILY PRN
Qty: 30 TABLET | Refills: 0 | Status: SHIPPED | OUTPATIENT
Start: 2024-10-02

## 2024-10-02 NOTE — ASSESSMENT & PLAN NOTE
Referral placed to vestibular therapy  Refilled meclizine  Orders:    meclizine (ANTIVERT) 25 mg tablet; Take 1 tablet (25 mg total) by mouth 3 (three) times a day as needed for dizziness    Ambulatory Referral to Physical Therapy; Future
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
## 2024-10-03 ENCOUNTER — TELEPHONE (OUTPATIENT)
Age: 54
End: 2024-10-03

## 2024-10-03 NOTE — TELEPHONE ENCOUNTER
Contacted patient in regards to Routine Referral in attempts to verify patient's needs of services and add patient to proper wait list.     Attempt #1

## 2024-10-03 NOTE — TELEPHONE ENCOUNTER
Pt returned phone call.  Pt has been added to Wait List for Talk Therapy and Medication Management    Resources sent to Email: [email or web address]

## 2024-10-10 ENCOUNTER — EVALUATION (OUTPATIENT)
Dept: PHYSICAL THERAPY | Facility: HOME HEALTHCARE | Age: 54
End: 2024-10-10
Payer: COMMERCIAL

## 2024-10-10 DIAGNOSIS — R42 VERTIGO: Primary | ICD-10-CM

## 2024-10-10 PROCEDURE — 97161 PT EVAL LOW COMPLEX 20 MIN: CPT | Performed by: PHYSICAL THERAPIST

## 2024-10-10 PROCEDURE — 97112 NEUROMUSCULAR REEDUCATION: CPT | Performed by: PHYSICAL THERAPIST

## 2024-10-10 PROCEDURE — 95992 CANALITH REPOSITIONING PROC: CPT | Performed by: PHYSICAL THERAPIST
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
## 2024-10-11 ENCOUNTER — OFFICE VISIT (OUTPATIENT)
Dept: PHYSICAL THERAPY | Facility: HOME HEALTHCARE | Age: 54
End: 2024-10-11
Payer: COMMERCIAL

## 2024-10-11 DIAGNOSIS — R42 VERTIGO: Primary | ICD-10-CM

## 2024-10-11 PROCEDURE — 97112 NEUROMUSCULAR REEDUCATION: CPT

## 2024-10-11 PROCEDURE — 95992 CANALITH REPOSITIONING PROC: CPT

## 2024-10-11 NOTE — PROGRESS NOTES
"Daily Note     Today's date: 10/11/2024  Patient name: Jeffrey Vann  : 1970  MRN: [id number]  Referring provider: Zoila Wells PA-C  Dx:   Encounter Diagnosis     ICD-10-CM    1. Vertigo  R42                      Subjective: Pt reports he is a little better today. Pt reports he feels a little dizzy and has room spinning feeling.       Objective: See treatment diary below      Assessment: Epley maneuver performed x 2. Pt reports dizziness and room spinning feeling t/o Epley maneuver x2.  Pt reports dizziness and blurry vision during VOR x 1 and room spinning feeling during VOR x 2. Pt reports symptoms subsided after short rest post Epley maneuver , VOR x 1 and VOR x 2.  Patient would benefit from continued PT      Plan: Continue per plan of care.      Precautions: vertigo      Manuals 10/10 10/11                                      Neuro Re-Ed        [place]-hallpike  L & R       Epley manuever To L x2 To L x 2               VOR x 1  80 bpm L/R  40\"     80 bpm  U/D  48\"      VOR x 2   Self pace  L/R  45\"    U/D 45\"                       Ther Ex                                                                        Ther Activity        Explanation of vertigo/ dizziness, recommended precautions to take after treatment                        Gait Training                        Modalities                             "

## 2024-10-16 ENCOUNTER — OFFICE VISIT (OUTPATIENT)
Dept: PHYSICAL THERAPY | Facility: HOME HEALTHCARE | Age: 54
End: 2024-10-16
Payer: COMMERCIAL

## 2024-10-16 DIAGNOSIS — R42 VERTIGO: Primary | ICD-10-CM

## 2024-10-16 PROCEDURE — 97112 NEUROMUSCULAR REEDUCATION: CPT

## 2024-10-16 PROCEDURE — 95992 CANALITH REPOSITIONING PROC: CPT

## 2024-10-16 NOTE — PROGRESS NOTES
"Daily Note     Today's date: 10/16/2024  Patient name: Jeffrey Vann  : 1970  MRN: [id number]  Referring provider: Zoila Wells PA-C  Dx:   Encounter Diagnosis     ICD-10-CM    1. Vertigo  R42                  Subjective: Pt reports he feels a little better but is dizzy.      Objective: See treatment diary below      Assessment: Epley maneuver performed x 2.  Pt reports room spinning and dizziness t/o Rosa maneuver x 2. Pt reports dizziness during VOR x 1 and VOR x 2.  Dizziness reported during romberg on foam with eyes closed and cone tracking L/R. Pt reports symptoms subsided after short rest post Epley maneuver , VOR x 1, VOR x 2, romberg stance and  cone tracking L/R. Patient would benefit from continued PT      Plan: Continue per plan of care.      Precautions: vertigo      Manuals 10/10 10/11 10/16                                     Neuro Re-Ed        [place]-hallpike  L & R       Epley manuever To L x2 To L x 2  To L x 2              VOR x 1  80 bpm L/R  40\"     80 bpm  U/D  48\" 80 bpm L/R  42\"    80 bpm U/D  39\"    *reviewed for HEP     VOR x 2   Self pace  L/R  45\"    U/D 45\"  Self pace  L/R  36\"    U/D  39\"    *reviewed for HEP        Romberg foam  EO/EC  30\" x1 ea  0 HHA        Cone  tracking R/L  Standing   1x10      Ther Ex                                                                        Ther Activity        Explanation of vertigo/ dizziness, recommended precautions to take after treatment                        Gait Training                        Modalities                               "

## 2024-10-18 ENCOUNTER — OFFICE VISIT (OUTPATIENT)
Dept: PHYSICAL THERAPY | Facility: HOME HEALTHCARE | Age: 54
End: 2024-10-18
Payer: COMMERCIAL

## 2024-10-18 DIAGNOSIS — R42 VERTIGO: Primary | ICD-10-CM

## 2024-10-18 PROCEDURE — 97140 MANUAL THERAPY 1/> REGIONS: CPT

## 2024-10-18 PROCEDURE — 95992 CANALITH REPOSITIONING PROC: CPT

## 2024-10-18 NOTE — PROGRESS NOTES
"Daily Note     Today's date: 10/18/2024  Patient name: Jeffrey Vann  : 1970  MRN: [id number]  Referring provider: Zoila Wells PA-C  Dx:   Encounter Diagnosis     ICD-10-CM    1. Vertigo  R42           Start Time: 910  Stop Time: 1005  Total time in clinic (min): 55 minutes    Subjective: Pt stated that his dizziness/ lightheadedness has minimally improved w/ skilled therapy sessions.  Pt still having sx when performing positional changes such as sitting from supine, STS.  Pt has performed VOR HEP consistently.       Objective: See treatment diary below      Assessment: Pt performed VOR exercises for longer duration and fought through bout of dizziness that last roughly 5 seconds during exercises.  PT performed orthostatic hypotension screening due pt's complaint of dizziness, pt's BP did not decrease, however pt still having sx that correlate w/ potential OH.  PT educated pt on importance of hydrating enough for body size to decrease drop in BP.  Pt had complaints of headaches (starting in occipital) and neck pain, PT performed CS stretching and relaxation techniques to decrease tightness and tension.         Plan: Continue per plan of care.      Precautions: vertigo      Manuals 10/10 10/11 10/16 10/18    SO Release    2x60\"  DF    CS decompression    2x60\"  DF    UT Stretch    B/l 45\"x2 each  DF            Neuro Re-Ed        [place]-hallpike  L & R   To R    Epley manuever To L x2 To L x 2  To L x 2  To R             VOR x 1  80 bpm L/R  40\"     80 bpm  U/D  48\" 80 bpm L/R  42\"    80 bpm U/D  39\"    *reviewed for HEP 80 bpm L/R  60\"    80 bpm U/D  60\"        VOR x 2   Self pace  L/R  45\"    U/D 45\"  Self pace  L/R  36\"    U/D  39\"    *reviewed for HEP 80 bpm L/R   60\"    80 bpm U/D 60\"       Romberg foam  EO/EC  30\" x1 ea  0 HHA        Cone  tracking R/L  Standing   1x10      Ther Ex                                                                        Ther Activity        Explanation of vertigo/ " dizziness, recommended precautions to take after treatment    Orhtostatic Hypotension screen    Supine = 108/74 mmHg    Seated rapidly = 110/78 mmHg        Explanation and education on importance of hydrating and increasing H20 intake to prevent lightheadedness/ dizziness            Gait Training                        Modalities

## 2024-10-21 ENCOUNTER — TELEPHONE (OUTPATIENT)
Age: 54
End: 2024-10-21

## 2024-10-21 ENCOUNTER — OFFICE VISIT (OUTPATIENT)
Dept: PHYSICAL THERAPY | Facility: HOME HEALTHCARE | Age: 54
End: 2024-10-21
Payer: COMMERCIAL

## 2024-10-21 DIAGNOSIS — R42 VERTIGO: Primary | ICD-10-CM

## 2024-10-21 PROCEDURE — 97112 NEUROMUSCULAR REEDUCATION: CPT

## 2024-10-21 PROCEDURE — 95992 CANALITH REPOSITIONING PROC: CPT

## 2024-10-21 NOTE — TELEPHONE ENCOUNTER
Phone call from patient stating he is bringing in FMLA forms tomorrow afternoon( while he is there for his kids appt) for Zoila to complete for his job.  Please advise.

## 2024-10-21 NOTE — PROGRESS NOTES
"Daily Note     Today's date: 10/21/2024  Patient name: Jeffrey Vann  : 1970  MRN: [id number]  Referring provider: Zoila Wells PA-C  Dx:   Encounter Diagnosis     ICD-10-CM    1. Vertigo  R42                      Subjective: Pt reports he still has dizziness but doesn't last as long.       Objective: See treatment diary below      Assessment: Progressed program today per PT Zina Jung. Pt reports dizziness and lightheaded t/o session. Pt reports symptoms subsided after a few seconds post  Epley maneuver and each exercise.  Patient would benefit from continued PT      Plan: Continue per plan of care.      Precautions: vertigo      Manuals 10/10 10/11 10/16 10/18 10/21   SO Release    2x60\"  DF    CS decompression    2x60\"  DF    UT Stretch    B/l 45\"x2 each  DF            Neuro Re-Ed        [place]-hallpike  L & R   To R    Epley manuever To L x2 To L x 2  To L x 2  To R  To L x 1           VOR x 1  80 bpm L/R  40\"     80 bpm  U/D  48\" 80 bpm L/R  42\"    80 bpm U/D  39\"    *reviewed for HEP 80 bpm L/R  60\"    80 bpm U/D  60\"     80 bpm L/R with checkerboard  Standing  38\"    80 bpm U/D with checkerboard standing 58\"     VOR x 2   Self pace  L/R  45\"    U/D 45\"  Self pace  L/R  36\"    U/D  39\"    *reviewed for HEP 80 bpm L/R   60\"    80 bpm U/D 60\" Self pace L/R with checkerboard standing   1'04\"    Self pace U/D with checkerboard  standing  50\"      Romberg foam  EO/EC  30\" x1 ea  0 HHA  Romberg foam  EO/EC  30\" x1 ea  0 HHA      Cone  tracking R/L  Standing   1x10   Cone  tracking R/L  Standing foam   1x10         Ball toss on foam with letter/number reading   x10        Amb with head turns  L/R <>x 1        Ball to table L/C/R  Standing 1x10         Standing checkerboard on table card read and papers 5'    Ther Ex                                                                        Ther Activity        Explanation of vertigo/ dizziness, recommended precautions to take after treatment    Orhtostatic " Hypotension screen    Supine = 108/74 mmHg    Seated rapidly = 110/78 mmHg        Explanation and education on importance of hydrating and increasing H20 intake to prevent lightheadedness/ dizziness            Gait Training                        Modalities

## 2024-10-23 ENCOUNTER — OFFICE VISIT (OUTPATIENT)
Dept: FAMILY MEDICINE CLINIC | Facility: CLINIC | Age: 54
End: 2024-10-23
Payer: COMMERCIAL

## 2024-10-23 VITALS
HEART RATE: 80 BPM | BODY MASS INDEX: 27.33 KG/M2 | DIASTOLIC BLOOD PRESSURE: 78 MMHG | SYSTOLIC BLOOD PRESSURE: 120 MMHG | TEMPERATURE: 97 F | OXYGEN SATURATION: 99 % | HEIGHT: 72 IN | WEIGHT: 201.8 LBS

## 2024-10-23 DIAGNOSIS — L72.3 INFLAMED SEBACEOUS CYST: ICD-10-CM

## 2024-10-23 DIAGNOSIS — R42 VERTIGO: Primary | ICD-10-CM

## 2024-10-23 PROCEDURE — 99213 OFFICE O/P EST LOW 20 MIN: CPT | Performed by: FAMILY MEDICINE

## 2024-10-23 RX ORDER — DOXYCYCLINE HYCLATE 100 MG
100 TABLET ORAL 2 TIMES DAILY
Qty: 20 TABLET | Refills: 0 | Status: SHIPPED | OUTPATIENT
Start: 2024-10-23 | End: 2024-11-02

## 2024-10-23 NOTE — ASSESSMENT & PLAN NOTE
Continue physical therapy, ear nose and throat consultation.  Will renew meclizine at this time.

## 2024-10-23 NOTE — PROGRESS NOTES
Ambulatory Visit  Name: Jeffrey Vann      : 1970      MRN: [id number]  Encounter Provider: Aubrey Lowe DO  Encounter Date: 10/23/2024   Encounter department: [facility]    Assessment & Plan  Vertigo  Continue physical therapy, ear nose and throat consultation.  Will renew meclizine at this time.       Inflamed sebaceous cyst  Continue warm compresses.  Will add doxycycline.  Follow-up with any persistent or worsening of symptoms.  Orders:    doxycycline hyclate (VIBRA-TABS) 100 mg tablet; Take 1 tablet (100 mg total) by mouth 2 (two) times a day for 10 days       History of Present Illness     Patient is a pleasant 54-year-old male who presents today for evaluation.  Patient has a history of vertigo and states he needs a refill on his meclizine.  The patient would like to continue with his current primary care provider (Ms. Zoila Wells PA-C).  He will schedule his annual physical with her.  Patient is also in physical therapy for vestibular training.  He is being followed by ear nose and throat.  He dropped off FMLA forms yesterday to be filled out.  He also tells me he has a sore bump under his right jaw in his beard area.  Occasional drainage at times.    Dizziness  Pertinent negatives include no abdominal pain, arthralgias, chest pain, chills, coughing, fever, rash, sore throat or vomiting.         Review of Systems   Constitutional:  Negative for chills and fever.   HENT:  Negative for ear pain and sore throat.    Eyes:  Negative for pain and visual disturbance.   Respiratory:  Negative for cough and shortness of breath.    Cardiovascular:  Negative for chest pain and palpitations.   Gastrointestinal:  Negative for abdominal pain and vomiting.   Genitourinary:  Negative for dysuria and hematuria.   Musculoskeletal:  Negative for arthralgias and back pain.   Skin:  Negative for color change and rash.        Right submandibular inflamed cyst in beard.    Neurological:  Positive for  dizziness (History of vertigo, needs refill of meclizine). Negative for seizures and syncope.   Psychiatric/Behavioral: Negative.     All other systems reviewed and are negative.          Objective     /78   Pulse 80   Temp (!) 97 °F (36.1 °C)   Ht 6' (1.829 m)   Wt 91.5 kg (201 lb 12.8 oz)   SpO2 99%   BMI 27.37 kg/m²     Physical Exam  Vitals and nursing note reviewed.   Constitutional:       General: He is not in acute distress.     Appearance: Normal appearance. He is well-developed.   HENT:      Head: Normocephalic and atraumatic.      Right Ear: Tympanic membrane normal.      Left Ear: Tympanic membrane normal.      Mouth/Throat:      Mouth: Mucous membranes are moist.      Pharynx: Oropharynx is clear.   Eyes:      Extraocular Movements: Extraocular movements intact.      Conjunctiva/sclera: Conjunctivae normal.      Pupils: Pupils are equal, round, and reactive to light.   Cardiovascular:      Rate and Rhythm: Normal rate and regular rhythm.      Heart sounds: Normal heart sounds. No murmur heard.     No friction rub.   Pulmonary:      Effort: Pulmonary effort is normal. No respiratory distress.      Breath sounds: Normal breath sounds.   Abdominal:      General: Bowel sounds are normal.      Palpations: Abdomen is soft.      Tenderness: There is no abdominal tenderness. There is no guarding or rebound.      Hernia: No hernia is present.   Musculoskeletal:         General: No swelling.      Cervical back: Neck supple.   Skin:     General: Skin is warm and dry.      Capillary Refill: Capillary refill takes less than 2 seconds.      Comments: Right submandibular area with inflamed cyst covered by beard. Some drainage.   Neurological:      General: No focal deficit present.      Mental Status: He is alert and oriented to person, place, and time.      Gait: Gait normal.   Psychiatric:         Mood and Affect: Mood normal.         Behavior: Behavior normal.         Thought Content: Thought content  normal.

## 2024-10-24 ENCOUNTER — OFFICE VISIT (OUTPATIENT)
Dept: PHYSICAL THERAPY | Facility: HOME HEALTHCARE | Age: 54
End: 2024-10-24
Payer: COMMERCIAL

## 2024-10-24 ENCOUNTER — TELEPHONE (OUTPATIENT)
Age: 54
End: 2024-10-24

## 2024-10-24 DIAGNOSIS — R42 VERTIGO: Primary | ICD-10-CM

## 2024-10-24 PROCEDURE — 97112 NEUROMUSCULAR REEDUCATION: CPT

## 2024-10-24 NOTE — PROGRESS NOTES
"Daily Note     Today's date: 10/24/2024  Patient name: Jeffrey Vnan  : 1970  MRN: [id number]  Referring provider: Zoila Wells PA-C  Dx:   Encounter Diagnosis     ICD-10-CM    1. Vertigo  R42                      Subjective: Pt reports he is still dizzy and lightheaded. Pt reports he feels like the room spinning feeling has decreased.       Objective: See treatment diary below      Assessment: Pt reports dizziness t/o all exercises and room spinning feeling during tandem amb on foam, treadmill amb with dual tasks and cone tracking. Pt reports symptoms subsided after a few seconds post each exercise. Progressed to treadmill amb with dual tasks, tandem amb and side stepping on foam and increased metronome bpm with VOR x 1 and VOR x 2 today.   Patient would benefit from continued PT      Plan: Continue per plan of care.      Precautions: vertigo      Manuals 10/24 10/11 10/16 10/18 10/21   SO Release    2x60\"  DF    CS decompression    2x60\"  DF    UT Stretch    B/l 45\"x2 each  DF            Neuro Re-Ed        [place]-hallpike     To R    Epley manuever  To L x 2  To L x 2  To R  To L x 1           VOR x 1 90 bpm L/R with checkerboard  Standing  Foam 43\"    90 bpm U/D with checkerboard standing  Foam 43\" 80 bpm L/R  40\"     80 bpm  U/D  48\" 80 bpm L/R  42\"    80 bpm U/D  39\"    *reviewed for HEP 80 bpm L/R  60\"    80 bpm U/D  60\"     80 bpm L/R with checkerboard  Standing  38\"    80 bpm U/D with checkerboard standing 58\"     VOR x 2  90 bpm L/R with checkerboard standing foam   41\"    90 bpm  U/D with checkerboard  standing foam  38\" Self pace  L/R  45\"    U/D 45\"  Self pace  L/R  36\"    U/D  39\"    *reviewed for HEP 80 bpm L/R   60\"    80 bpm U/D 60\" Self pace L/R with checkerboard standing   1'04\"    Self pace U/D with checkerboard  standing  50\"    Romberg foam  EO/EC  30\" x1 ea  0 HHA  Romberg foam  EO/EC  30\" x1 ea  0 HHA  Romberg foam  EO/EC  30\" x1 ea  0 HHA    Cone  tracking R/L  Standing foam   1x 8  " COLON AND RECTAL SURGERY CONSULT  2021     Patient: Jorge Luis Parkinson  MRN:[id number]  : 1959    Reason for visit: Jorge Luis Parkinson is a(n) 62 year old male here today for a consultation for evaluation for colonoscopy. Jorge Luis Parkinson is accompanied by no one.    Referred by:   Juliette Catherine MD     History of present illness:  This is a 62 year old male that presents to the office for evaluation for colonoscopy.  This patient's last colonoscopy was 10 years ago which showed some polyps.  The patient  admits family history of colon polyps in the father.  The patient denies symptoms such as rectal pain, rectal bleeding, abdominal pain, nausea, vomiting, fevers, chills, weakness, dizziness, or unintentional weight loss.  Patient does not endorse changes in bowel movements.  The patient is tolerating food without issues.  No other complaints.    Review of systems:  Const: Normal.   Allergy & Immunology: Normal.   Eyes: Normal.   ENT: Normal.   CV: Normal.   Resp: Normal.   Breast: Normal.   GI:. Per HPI.   : Normal.   Endo: Normal.   Heme/Lymph: Normal.   Musc: Normal.   Neuro: Normal.   Psych: Normal.   Skin: Normal.    Active problems:  Patient Active Problem List   Diagnosis   • Essential hypertension   • Sinus bradycardia   • Mixed hyperlipidemia       Past medical history:  Past Medical History:   Diagnosis Date   • Essential (primary) hypertension    • Hyperlipidemia    • Sinus bradycardia        Surgical history:   Past Surgical History:   Procedure Laterality Date   • Vasectomy         Social history:  Social History     Socioeconomic History   • Marital status: /Civil Union     Spouse name: Not on file   • Number of children: 3   • Years of education: Not on file   • Highest education level: Professional school degree (e.g., MD, DDS, DVM, VICENTE)   Occupational History   • Not on file   Tobacco Use   • Smoking status: Never Smoker   • Smokeless tobacco: Never Used   Substance and Sexual Activity    • Alcohol use: Yes   • Drug use: Never   • Sexual activity: Not on file   Other Topics Concern   • Not on file   Social History Narrative   • Not on file     Social Determinants of Health     Financial Resource Strain:    • Social Determinants: Financial Resource Strain:    Food Insecurity:    • Social Determinants: Food Insecurity:    Transportation Needs:    • Lack of Transportation (Medical):    • Lack of Transportation (Non-Medical):    Physical Activity: Inactive   • Days of Exercise per Week: 0 days   • Minutes of Exercise per Session: 0 min   Stress:    • Social Determinants: Stress:    Social Connections:    • Social Determinants: Social Connections:    Intimate Partner Violence:    • Social Determinants: Intimate Partner Violence Past Fear:    • Social Determinants: Intimate Partner Violence Current Fear:        Family history:  Family History   Problem Relation Age of Onset   • Hypertension Mother    • Stroke Father 77   • Myocardial Infarction Father 80   • Hypertension Father        Review:  Past medical history, problem list, family medical history, surgical history and social history reviewed.     Allergies:   ALLERGIES:   Allergen Reactions   • Mold   (Environmental) Other (See Comments)       Current medications:  Current Outpatient Medications   Medication Sig   • amLODIPine (NORVASC) 5 MG tablet TAKE 1 TABLET DAILY   • losartan-hydrochlorothiazide (HYZAAR) 100-25 MG per tablet TAKE 1 TABLET DAILY   • atorvastatin (LIPITOR) 20 MG tablet Take 1 tablet by mouth daily. As directed.   • aspirin 81 MG EC tablet Take 81 mg by mouth daily.   • Aspirin Buf,CaCarb-MgCarb-MgO, 81 MG Tab Take by mouth daily.     No current facility-administered medications for this visit.       Vitals:  Visit Vitals  BP (!) 144/83   Pulse 67   Temp 97.9 °F (36.6 °C) (Oral)   Resp 17   Ht 6' 1.23\" (1.86 m)   Wt 104.8 kg (231 lb)   SpO2 95%   BMI 30.29 kg/m²       Physical exam:  Constitutional: alert, in no acute distress and  "Cone  tracking R/L  Standing   1x10   Cone  tracking R/L  Standing foam   1x10     Romberg foam  EO/EC  30\" x1 ea  0 HHA    Romberg foam  EO/EC  30\" x1 ea  0 HHA    Tandem and side step on foam   <> x 3     Amb with head turns  L/R <>x 1    Ball to table L/C/R  Standing 1x10     Ball to table L/C/R  Standing 1x10     Treadmill   0.6 mph   6 min   dual tasks/  papers      Standing checkerboard on table card read and papers 5'              Ther Ex                                                                        Ther Activity        Explanation of vertigo/ dizziness, recommended precautions to take after treatment    Orhtostatic Hypotension screen    Supine = 108/74 mmHg    Seated rapidly = 110/78 mmHg        Explanation and education on importance of hydrating and increasing H20 intake to prevent lightheadedness/ dizziness            Gait Training                        Modalities                                     " current vital signs reviewed.   Eyes: normal conjunctiva, no eyelid swelling and the sclerae were normal.   Neck: normal appearing neck, supple neck and no mass was seen.   Pulmonary: no respiratory distress, normal respiratory rate and effort and no accessory muscle use. breath sounds clear to auscultation bilaterally.   Cardiovascular: normal rate, no murmurs were heard, regular rhythm, normal S1 and normal S2.   Abdomen: soft, nontender, nondistended, normal bowel sounds and no abdominal mass. no hepatomegaly and no splenomegaly.   Rectal: Deferred to colonoscopy.  Psychiatric: oriented to person, oriented to place and oriented to time. alert and awake.   Skin, Hair, Nails: normal skin color and pigmentation.       Assessment:   Personal history of colonic polyps    Plan:    Patient with personal history of polyps:    Patient to be scheduled in the near future for a colonoscopy.  All the risks and benefits of the procedure were explained to the patient including bleeding, infection, bowel perforation.  The bowel prep was explained to the patient.  The patient to be on clear liquid diet the day before procedure, and nothing by mouth the day of procedure.  If applicable, the patient will need to stop any anticoagulation for the 7 days prior to the colonoscopy.  If applicable, the patient will take half a dose of metformin for diabetes with a small sip of water on the day of the procedure.    Risks, benefits, alternatives, expectations and preparations were discussed with the patient, who understands and agrees.    Thank you for involving me in the patient's care.    Henok Roach MD, NICOLE, FACS, FASCRS  Colon and Rectal Surgery  [facility], [place].

## 2024-10-24 NOTE — TELEPHONE ENCOUNTER
Patient called in stating he will need another PT referral according to the progression reports from PT. He was told in order to continue going to PT he will need the referral by next week. Please advise.

## 2024-10-30 ENCOUNTER — OFFICE VISIT (OUTPATIENT)
Dept: PHYSICAL THERAPY | Facility: HOME HEALTHCARE | Age: 54
End: 2024-10-30
Payer: COMMERCIAL

## 2024-10-30 DIAGNOSIS — R42 VERTIGO: Primary | ICD-10-CM

## 2024-10-30 PROCEDURE — 97112 NEUROMUSCULAR REEDUCATION: CPT

## 2024-10-30 NOTE — PROGRESS NOTES
"Daily Note     Today's date: 10/30/2024  Patient name: Jeffrey Vann  : 1970  MRN: [id number]  Referring provider: Zoila Wells PA-C  Dx:   Encounter Diagnosis     ICD-10-CM    1. Vertigo  R42                      Subjective: Pt reports the room spinning and dizziness has been not as bad.       Objective: See treatment diary below      Assessment: Pt reports dizziness t/o all exercises.  Pt reports symptoms subsided after a few seconds post each exercise. Progressed to checkerboard with ball to table exercise today.  Pt challenged with current metronome bpm. Patient would benefit from continued PT      Plan: Continue per plan of care.      Precautions: vertigo      Manuals 10/24 10/30 10/16 10/18 10/21   SO Release    2x60\"  DF    CS decompression    2x60\"  DF    UT Stretch    B/l 45\"x2 each  DF            Neuro Re-Ed        [place]-hallpike     To R    Epley manuever   To L x 2  To R  To L x 1           VOR x 1 90 bpm L/R with checkerboard  Standing  Foam 43\"    90 bpm U/D with checkerboard standing  Foam 43\" 90 bpm L/R with checkerboard  Standing  Foam 42\"    90 bpm U/D with checkerboard standing  Foam 50\" 80 bpm L/R  42\"    80 bpm U/D  39\"    *reviewed for HEP 80 bpm L/R  60\"    80 bpm U/D  60\"     80 bpm L/R with checkerboard  Standing  38\"    80 bpm U/D with checkerboard standing 58\"     VOR x 2  90 bpm L/R with checkerboard standing foam   41\"    90 bpm  U/D with checkerboard  standing foam  38\" 90 bpm L/R with checkerboard standing foam   45\"    90 bpm  U/D with checkerboard  standing foam  45\" Self pace  L/R  36\"    U/D  39\"    *reviewed for HEP 80 bpm L/R   60\"    80 bpm U/D 60\" Self pace L/R with checkerboard standing   1'04\"    Self pace U/D with checkerboard  standing  50\"    Cone  tracking R/L  Standing foam   1x 8 Ball toss with letter read on foam  x 20  Romberg foam  EO/EC  30\" x1 ea  0 HHA  Romberg foam  EO/EC  30\" x1 ea  0 HHA    Cone  tracking R/L  Standing foam   1x 8 Cone  tracking " "R/L  Standing foam   1x 10 Cone  tracking R/L  Standing   1x10   Cone  tracking R/L  Standing foam   1x10     Romberg foam  EO/EC  30\" x1 ea  0 HHA Romberg foam  EO/EC  30\" x1 ea  0 HHA   Romberg foam  EO/EC  30\" x1 ea  0 HHA    Tandem and side step on foam   <> x 3  Tandem and side step on foam   <> x 3    Amb with head turns  L/R <>x 1    Ball to table L/C/R  Standing 1x10  Ball to table L/C/R  Standing 1x10 checkerboard    Ball to table L/C/R  Standing 1x10     Treadmill   0.6 mph   6 min   dual tasks/  papers   Treadmill   0.6 mph 8 min   dual tasks/  papers     Standing checkerboard on table card read and papers 5'        Amb with head turns L/R and U/D <>x 1       Ther Ex                                                                        Ther Activity        Explanation of vertigo/ dizziness, recommended precautions to take after treatment    Orhtostatic Hypotension screen    Supine = 108/74 mmHg    Seated rapidly = 110/78 mmHg        Explanation and education on importance of hydrating and increasing H20 intake to prevent lightheadedness/ dizziness            Gait Training                        Modalities                                       "

## 2024-11-01 ENCOUNTER — OFFICE VISIT (OUTPATIENT)
Dept: PHYSICAL THERAPY | Facility: HOME HEALTHCARE | Age: 54
End: 2024-11-01
Payer: COMMERCIAL

## 2024-11-01 DIAGNOSIS — R42 VERTIGO: Primary | ICD-10-CM

## 2024-11-01 PROCEDURE — 97112 NEUROMUSCULAR REEDUCATION: CPT | Performed by: PHYSICAL THERAPIST

## 2024-11-01 PROCEDURE — 97530 THERAPEUTIC ACTIVITIES: CPT | Performed by: PHYSICAL THERAPIST

## 2024-11-01 NOTE — PROGRESS NOTES
"Daily Note     Today's date: 2024  Patient name: Jeffrey Vann  : 1970  MRN: [id number]  Referring provider: Zoila Wells PA-C  Dx:   Encounter Diagnosis     ICD-10-CM    1. Vertigo  R42           Start Time: 830  Stop Time: 915  Total time in clinic (min): 45 minutes    Subjective: Pt stated that he has felt slight improvement but is still having difficulty when performing positional changes and head turns during ambulation.      Objective: See treatment diary below      Assessment: Pt tolerated treatment session focusing on dynamic balance w/ vestibular components.  Pt was able to perform w/o LOB but stated that he had minor sx while performing.  PT spoke w/ pt at length about next steps coming forward w/ treatment and to talk w/ PCP about current sx.  Pt obliged and stated that he would.  PT will keep pt on hold for skilled therapy.       Plan: Continue per plan of care.      Precautions: vertigo      Manuals 10/24 10/30 11/1 10/18 10/21   SO Release    2x60\"  DF    CS decompression    2x60\"  DF    UT Stretch    B/l 45\"x2 each  DF            Neuro Re-Ed        [place]-hallpike     To R    Epley manuever    To R  To L x 1           VOR x 1 90 bpm L/R with checkerboard  Standing  Foam 43\"    90 bpm U/D with checkerboard standing  Foam 43\" 90 bpm L/R with checkerboard  Standing  Foam 42\"    90 bpm U/D with checkerboard standing  Foam 50\"  80 bpm L/R  60\"    80 bpm U/D  60\"     80 bpm L/R with checkerboard  Standing  38\"    80 bpm U/D with checkerboard standing 58\"     VOR x 2  90 bpm L/R with checkerboard standing foam   41\"    90 bpm  U/D with checkerboard  standing foam  38\" 90 bpm L/R with checkerboard standing foam   45\"    90 bpm  U/D with checkerboard  standing foam  45\"  80 bpm L/R   60\"    80 bpm U/D 60\" Self pace L/R with checkerboard standing   1'04\"    Self pace U/D with checkerboard  standing  50\"    Cone  tracking R/L  Standing foam   1x 8 Ball toss with letter read on foam  x 20    " "Romberg foam  EO/EC  30\" x1 ea  0 HHA    Cone  tracking R/L  Standing foam   1x 8 Cone  tracking R/L  Standing foam   1x 10   Cone  tracking R/L  Standing foam   1x10     Romberg foam  EO/EC  30\" x1 ea  0 HHA Romberg foam  EO/EC  30\" x1 ea  0 HHA   Romberg foam  EO/EC  30\" x1 ea  0 HHA    Tandem and side step on foam   <> x 3  Tandem and side step on foam   <> x 3    Amb with head turns  L/R <>x 1    Ball to table L/C/R  Standing 1x10  Ball to table L/C/R  Standing 1x10 checkerboard    Ball to table L/C/R  Standing 1x10     Treadmill   0.6 mph   6 min   dual tasks/  papers   Treadmill   0.6 mph 8 min   dual tasks/  papers     Standing checkerboard on table card read and papers 5'        Amb with head turns L/R and U/D <>x 1               Ambulation w/ ball toss/bounce   4x <> w/ variable tosses and bounces     Ambulation w/ Head turns    4x R-L & U-D    W/ ball toss x2     Ambulation w/ cone tracking and reaching   4x <> w/ variable heights and distances.  Pt reaching w/ contralateral side                                     Ther Ex                                                                        Ther Activity        Explanation of vertigo/ dizziness, recommended precautions to take after treatment    Orhtostatic Hypotension screen    Supine = 108/74 mmHg    Seated rapidly = 110/78 mmHg        Explanation and education on importance of hydrating and increasing H20 intake to prevent lightheadedness/ dizziness            Speaking w/ pt about vestibular next steps and to speak to PCP D   DF             Gait Training                        Modalities                                         "

## 2024-11-04 ENCOUNTER — TELEPHONE (OUTPATIENT)
Age: 54
End: 2024-11-04

## 2024-11-04 NOTE — TELEPHONE ENCOUNTER
Received call from pt inquiring as to the status of his [facility] paperwork.  Pt stated that he received a notice from work with a new due date. Pt is requesting a phone call or a My Chart message when it is completed.  Please review.

## 2024-11-05 ENCOUNTER — OFFICE VISIT (OUTPATIENT)
Dept: FAMILY MEDICINE CLINIC | Facility: CLINIC | Age: 54
End: 2024-11-05
Payer: COMMERCIAL

## 2024-11-05 ENCOUNTER — HOSPITAL ENCOUNTER (OUTPATIENT)
Dept: ULTRASOUND IMAGING | Facility: HOSPITAL | Age: 54
Discharge: HOME | End: 2024-11-05
Payer: COMMERCIAL

## 2024-11-05 VITALS
SYSTOLIC BLOOD PRESSURE: 122 MMHG | WEIGHT: 200.2 LBS | OXYGEN SATURATION: 99 % | DIASTOLIC BLOOD PRESSURE: 76 MMHG | HEIGHT: 72 IN | BODY MASS INDEX: 27.12 KG/M2 | HEART RATE: 58 BPM

## 2024-11-05 DIAGNOSIS — R22.1 LOCALIZED SWELLING, MASS OR LUMP OF NECK: ICD-10-CM

## 2024-11-05 DIAGNOSIS — M79.89 MASS OF SOFT TISSUE OF FACE: ICD-10-CM

## 2024-11-05 DIAGNOSIS — R22.32 MASS OF LEFT AXILLA: ICD-10-CM

## 2024-11-05 DIAGNOSIS — R42 VERTIGO: ICD-10-CM

## 2024-11-05 DIAGNOSIS — F52.4 PREMATURE EJACULATION: ICD-10-CM

## 2024-11-05 DIAGNOSIS — R22.32 MASS OF LEFT AXILLA: Primary | ICD-10-CM

## 2024-11-05 PROBLEM — K04.7 DENTAL INFECTION: Status: RESOLVED | Noted: 2024-04-04 | Resolved: 2024-11-05

## 2024-11-05 PROCEDURE — 76705 ECHO EXAM OF ABDOMEN: CPT

## 2024-11-05 PROCEDURE — 99214 OFFICE O/P EST MOD 30 MIN: CPT | Performed by: PHYSICIAN ASSISTANT

## 2024-11-05 PROCEDURE — 76536 US EXAM OF HEAD AND NECK: CPT

## 2024-11-05 NOTE — PROGRESS NOTES
Ambulatory Visit  Name: Jeffrye Vann      : 1970      MRN: [id number]  Encounter Provider: Zoila Wells PA-C  Encounter Date: 2024   Encounter department: [facility]    Assessment & Plan  Mass of left axilla  Will get ultrasound to further evaluate.  Patient states that this has been there for multiple years.  Likely sebaceous cyst.  Will likely refer to general surgery for excision  Orders:    US superficial lump (non extremity); Future    Localized swelling, mass or lump of neck  Will get ultrasound of lump on neck and chin.  Again, likely sebaceous cyst.  Will likely refer to general surgery for excision  Orders:    US head neck soft tissue; Future    Mass of soft tissue of face  Will get ultrasound of lump on neck and chin.  Again, likely sebaceous cyst.  Will likely refer to general surgery for excision  Orders:    US head neck soft tissue; Future    Vertigo  Vertigo has significantly improved.  Patient feels ready to return to work.  Episodes are only intermittent, and happen with sudden position changes.  Encouraged him to continue to do exercises that he learned at physical therapy.  He does not feel he needs to continue formal physical therapy.       Premature ejaculation  Provided patient with reassurance. I encouraged him to communicate needs with partner. I explained that there is no medication for this if he is able to get an erection and keep an erection without difficulty. I recommended that he try to focus on improving his mental health. He has had a lot of stressors in his life.           History of Present Illness     Jeffrey is a 54 year old male who is here today to follow-up for vertigo. He has completed PT. He admits that he is doing much better. The episodes are much less frequent, less severe, and do not last as long. He is still taking meclizine occasionally as needed. He feels ready to go back to work. He is also complaining of a cyst on his face. He has had  similar lumps in the past. He has had one on his neck and another under his left arm for 5-10 years. They usually are not painful. However, the one on his face was draining. He did see Dr. Lowe. He was prescribed Doxycycline, but that did not help improve his symptoms. He is interested in getting them excised if possible. Lastly, he is concerned about pre-mature ejaculation. He states that he and his partner have been having issues with their intimacy because she feels he ejaculates too quickly. He is able to get an erection and keep an erection without difficulty. He admits that he is emotionally strained. He has been trying to focus on paying the bills and putting food on the table for his family. He is interested in his partner, but she does not feel he is. He wanted to know if there is anything else he can do.           Review of Systems   Constitutional:  Negative for chills, diaphoresis, fatigue and fever.   HENT:  Negative for congestion, ear pain, postnasal drip, rhinorrhea, sneezing, sore throat and trouble swallowing.    Eyes:  Negative for pain and visual disturbance.   Respiratory:  Negative for apnea, cough, shortness of breath and wheezing.    Cardiovascular:  Negative for chest pain and palpitations.   Gastrointestinal:  Negative for abdominal pain, constipation, diarrhea, nausea and vomiting.   Genitourinary:  Negative for dysuria.        Premature ejaculation   Musculoskeletal:  Negative for arthralgias, gait problem and myalgias.   Neurological:  Positive for dizziness (improved). Negative for syncope, weakness, light-headedness, numbness and headaches.   Psychiatric/Behavioral:  Negative for suicidal ideas. The patient is not nervous/anxious.            Objective     /76   Pulse 58   Ht 6' (1.829 m)   Wt 90.8 kg (200 lb 3.2 oz)   SpO2 99%   BMI 27.15 kg/m²     Physical Exam  Constitutional:       Appearance: He is well-developed.   HENT:      Head: Normocephalic and atraumatic.       Right Ear: Tympanic membrane, ear canal and external ear normal.      Left Ear: Tympanic membrane, ear canal and external ear normal.      Nose: Nose normal.      Mouth/Throat:      Pharynx: No oropharyngeal exudate or posterior oropharyngeal erythema.   Eyes:      Extraocular Movements: Extraocular movements intact.   Cardiovascular:      Rate and Rhythm: Normal rate and regular rhythm.      Heart sounds: Normal heart sounds. No murmur heard.     No friction rub. No gallop.   Pulmonary:      Effort: Pulmonary effort is normal. No respiratory distress.      Breath sounds: Normal breath sounds. No wheezing or rales.   Musculoskeletal:         General: Normal range of motion.      Cervical back: Normal range of motion and neck supple.   Lymphadenopathy:      Cervical: No cervical adenopathy.   Skin:     General: Skin is warm and dry.          Neurological:      Mental Status: He is alert and oriented to person, place, and time.      Comments: Mild dizziness with [place]-Hallpike Maneuver    Psychiatric:         Mood and Affect: Mood is depressed. Mood is not anxious.         Behavior: Behavior normal.         Thought Content: Thought content normal.         Judgment: Judgment normal.

## 2024-11-05 NOTE — ASSESSMENT & PLAN NOTE
Vertigo has significantly improved.  Patient feels ready to return to work.  Episodes are only intermittent, and happen with sudden position changes.  Encouraged him to continue to do exercises that he learned at physical therapy.  He does not feel he needs to continue formal physical therapy.

## 2024-11-05 NOTE — LETTER
November 5, 2024     Patient: Jeffrey Vann  YOB: 1970  Date of Visit: 11/5/2024      To Whom it May Concern:    Jeffrey Vann is under my professional care. Jeffrey was seen in my office on 11/5/2024. Jeffrey may return to work on 11/12/24 without restrictions .    If you have any questions or concerns, please don't hesitate to call.         Sincerely,            Zoila Wells PA-C

## 2024-11-11 ENCOUNTER — TELEPHONE (OUTPATIENT)
Age: 54
End: 2024-11-11

## 2024-11-11 DIAGNOSIS — L72.3 SEBACEOUS CYST: ICD-10-CM

## 2024-11-11 DIAGNOSIS — L72.3 SEBACEOUS CYST OF AXILLA: Primary | ICD-10-CM

## 2024-11-11 NOTE — TELEPHONE ENCOUNTER
Pt called to f/u on US completed on 11/5/24.  Pt states he has multiple cysts and is looking for guidance on how to proceed.  The one that is currently bothering him the most is the cyst that is under his jaw line.  He states it is having a malodorous discharge.  States he can't see any color output but despite washing the area with soap and water is still smells horrible.  Please review and advise how pt should proceed.  Preferred pharmacy pended.

## 2024-11-14 ENCOUNTER — TELEPHONE (OUTPATIENT)
Dept: SURGERY | Facility: CLINIC | Age: 54
End: 2024-11-14

## 2024-11-19 ENCOUNTER — OFFICE VISIT (OUTPATIENT)
Dept: SURGERY | Facility: CLINIC | Age: 54
End: 2024-11-19
Payer: COMMERCIAL

## 2024-11-19 VITALS
OXYGEN SATURATION: 99 % | HEART RATE: 81 BPM | SYSTOLIC BLOOD PRESSURE: 124 MMHG | TEMPERATURE: 99 F | HEIGHT: 72 IN | BODY MASS INDEX: 28.79 KG/M2 | WEIGHT: 212.6 LBS | DIASTOLIC BLOOD PRESSURE: 74 MMHG

## 2024-11-19 DIAGNOSIS — L72.3 SEBACEOUS CYST: ICD-10-CM

## 2024-11-19 DIAGNOSIS — L72.0 EPIDERMAL INCLUSION CYST: Primary | ICD-10-CM

## 2024-11-19 DIAGNOSIS — L72.3 SEBACEOUS CYST OF AXILLA: ICD-10-CM

## 2024-11-19 PROCEDURE — 99244 OFF/OP CNSLTJ NEW/EST MOD 40: CPT | Performed by: SURGERY

## 2024-11-19 RX ORDER — HEPARIN SODIUM 5000 [USP'U]/ML
5000 INJECTION, SOLUTION INTRAVENOUS; SUBCUTANEOUS ONCE
OUTPATIENT
Start: 2024-11-19 | End: 2024-11-19

## 2024-11-19 NOTE — ASSESSMENT & PLAN NOTE
Symptomatic epidermal inclusion cyst of the right submental/neck region and an additional cystic lesion in the right posterior lateral neck..  Patient wishes to have this excised.  I do think this is reasonable,  we will excise this under sedation in the OR. The procedure itself including associated risk were discussed including bleeding, infection, recurrence. Patient verbalized understands risk and is is willing to proceed, consent was signed. Patient requires no additional workup at this time.

## 2024-11-19 NOTE — H&P (VIEW-ONLY)
Name: Jeffrey Vann      : 1970      MRN: [id number]  Encounter Provider: Eleazar Trejo DO  Encounter Date: 2024   Encounter department: [facility]  :  Assessment & Plan  Sebaceous cyst of axilla  Symptomatic sebaceous cyst of the left axilla.  Patient was to have this excised.  I do think this is reasonable we will we will excise this under sedation in the OR along with the additional epidermal inclusion cyst of the neck and face.  The procedure itself including associated risk were discussed including bleeding, infection, recurrence. Patient verbalized understands risk and is is willing to proceed, consent was signed. Patient requires no additional workup at this time.      Orders:    Ambulatory Referral to General Surgery    Sebaceous cyst    Orders:    Ambulatory Referral to General Surgery    Epidermal inclusion cyst  Symptomatic epidermal inclusion cyst of the right submental/neck region and an additional cystic lesion in the right posterior lateral neck..  Patient wishes to have this excised.  I do think this is reasonable,  we will excise this under sedation in the OR. The procedure itself including associated risk were discussed including bleeding, infection, recurrence. Patient verbalized understands risk and is is willing to proceed, consent was signed. Patient requires no additional workup at this time.           Notes:    Recent PCP note dated 2024 was personally reviewed by me.    Imaging:    Ultrasound the head and neck dated  in addition to the ultrasound the left axilla dated  reports were personally reviewed by me.    Cardiology:    EKG dated 2024 was personally reviewed by me.      History of Present Illness    HPI  Jeffrey Vann is a 54 y.o. male who presents today in consultation for multiple cystic lesions of the face/neck and left axilla.  Patient was seen by PCP earlier this month.  He was sent for ultrasound  which did confirm cystic lesions of the face/neck and the left axilla.  Patient states that the cysts are particularly bothersome.  The one on the posterior neck and causes some discomfort especially when he gets his hair done.  The one underneath his chin/mandible region also causes him some significant discomfort occasionally and he would like to have it removed.  Furthermore he has a new cyst he states on his left armpit which also causes him occasional discomfort.  Patient wishes to have all of the cyst removed.  Denies any active infection.  No drainage.    History obtained from: patient    Review of Systems    Review of systems completed, all negative except as notable HPI.    Past Medical History  Past Medical History:   Diagnosis Date    Asthma     Disease of thyroid gland     PTSD (post-traumatic stress disorder)      Past Surgical History:   Procedure Laterality Date    HERNIA REPAIR      2013    WRIST ARTHROPLASTY Right     2015     Family History   Problem Relation Age of Onset    Diabetes Father     Asthma Father       reports that he has been smoking cigarettes. He has never used smokeless tobacco. He reports that he does not currently use alcohol. He reports that he does not use drugs.  Current Outpatient Medications on File Prior to Visit   Medication Sig Dispense Refill    meclizine (ANTIVERT) 25 mg tablet Take 1 tablet (25 mg total) by mouth 3 (three) times a day as needed for dizziness 30 tablet 0     No current facility-administered medications on file prior to visit.   No Known Allergies   Current Outpatient Medications on File Prior to Visit   Medication Sig Dispense Refill    meclizine (ANTIVERT) 25 mg tablet Take 1 tablet (25 mg total) by mouth 3 (three) times a day as needed for dizziness 30 tablet 0     No current facility-administered medications on file prior to visit.      Social History     Tobacco Use    Smoking status: Some Days     Types: Cigarettes    Smokeless tobacco: Never    Vaping Use    Vaping status: Never Used   Substance and Sexual Activity    Alcohol use: Not Currently    Drug use: Never    Sexual activity: Yes     Partners: Female        Objective  /74 (BP Location: Left arm, Patient Position: Sitting, Cuff Size: Standard)   Pulse 81   Temp 99 °F (37.2 °C) (Temporal)   Ht 6' (1.829 m)   Wt 96.4 kg (212 lb 9.6 oz)   SpO2 99%   BMI 28.83 kg/m²      Physical Exam  Vitals reviewed.   Constitutional:       General: He is not in acute distress.     Appearance: Normal appearance. He is not ill-appearing, toxic-appearing or diaphoretic.   HENT:      Head: Normocephalic and atraumatic.      Right Ear: External ear normal.      Left Ear: External ear normal.   Eyes:      General: No scleral icterus.        Right eye: No discharge.         Left eye: No discharge.   Cardiovascular:      Rate and Rhythm: Normal rate and regular rhythm.      Heart sounds: Normal heart sounds.   Pulmonary:      Effort: Pulmonary effort is normal. No respiratory distress.      Breath sounds: Normal breath sounds. No stridor. No wheezing.   Musculoskeletal:         General: No swelling. Normal range of motion.      Cervical back: Normal range of motion.      Right lower leg: No edema.      Left lower leg: No edema.   Skin:     General: Skin is warm.      Coloration: Skin is not jaundiced or pale.      Findings: No bruising or erythema.      Comments: Large cystic lesion approximately 3 cm diameter on the face/neck region at approximately the junction of the right mandible and right submental region.  Mobile.  Mildly tender.  No evidence of erythema or active drainage.  Consistent with cystic lesion.    Additional posterior lateral cystic lesion of the neck also consistent with epidermal inclusion cyst.  Mild discomfort.  No erythema or active drainage    Lastly there is a cystic lesion of the left axilla.  Mild tenderness.  No significant drainage or erythema noted   Neurological:      General: No  focal deficit present.      Mental Status: He is alert and oriented to person, place, and time.      Cranial Nerves: No cranial nerve deficit.   Psychiatric:         Mood and Affect: Mood normal.         Behavior: Behavior normal.         Thought Content: Thought content normal.         Judgment: Judgment normal.

## 2024-11-19 NOTE — H&P
Name: Jeffrey Vann      : 1970      MRN: [id number]  Encounter Provider: Eleazar Trejo DO  Encounter Date: 2024   Encounter department: [facility]  :  Assessment & Plan  Sebaceous cyst of axilla  Symptomatic sebaceous cyst of the left axilla.  Patient was to have this excised.  I do think this is reasonable we will we will excise this under sedation in the OR along with the additional epidermal inclusion cyst of the neck and face.  The procedure itself including associated risk were discussed including bleeding, infection, recurrence. Patient verbalized understands risk and is is willing to proceed, consent was signed. Patient requires no additional workup at this time.      Orders:    Ambulatory Referral to General Surgery    Sebaceous cyst    Orders:    Ambulatory Referral to General Surgery    Epidermal inclusion cyst  Symptomatic epidermal inclusion cyst of the right submental/neck region and an additional cystic lesion in the right posterior lateral neck..  Patient wishes to have this excised.  I do think this is reasonable,  we will excise this under sedation in the OR. The procedure itself including associated risk were discussed including bleeding, infection, recurrence. Patient verbalized understands risk and is is willing to proceed, consent was signed. Patient requires no additional workup at this time.           Notes:    Recent PCP note dated 2024 was personally reviewed by me.    Imaging:    Ultrasound the head and neck dated  in addition to the ultrasound the left axilla dated  reports were personally reviewed by me.    Cardiology:    EKG dated 2024 was personally reviewed by me.      History of Present Illness    HPI  Jeffrey Vann is a 54 y.o. male who presents today in consultation for multiple cystic lesions of the face/neck and left axilla.  Patient was seen by PCP earlier this month.  He was sent for ultrasound  which did confirm cystic lesions of the face/neck and the left axilla.  Patient states that the cysts are particularly bothersome.  The one on the posterior neck and causes some discomfort especially when he gets his hair done.  The one underneath his chin/mandible region also causes him some significant discomfort occasionally and he would like to have it removed.  Furthermore he has a new cyst he states on his left armpit which also causes him occasional discomfort.  Patient wishes to have all of the cyst removed.  Denies any active infection.  No drainage.    History obtained from: patient    Review of Systems    Review of systems completed, all negative except as notable HPI.    Past Medical History  Past Medical History:   Diagnosis Date    Asthma     Disease of thyroid gland     PTSD (post-traumatic stress disorder)      Past Surgical History:   Procedure Laterality Date    HERNIA REPAIR      2013    WRIST ARTHROPLASTY Right     2015     Family History   Problem Relation Age of Onset    Diabetes Father     Asthma Father       reports that he has been smoking cigarettes. He has never used smokeless tobacco. He reports that he does not currently use alcohol. He reports that he does not use drugs.  Current Outpatient Medications on File Prior to Visit   Medication Sig Dispense Refill    meclizine (ANTIVERT) 25 mg tablet Take 1 tablet (25 mg total) by mouth 3 (three) times a day as needed for dizziness 30 tablet 0     No current facility-administered medications on file prior to visit.   No Known Allergies   Current Outpatient Medications on File Prior to Visit   Medication Sig Dispense Refill    meclizine (ANTIVERT) 25 mg tablet Take 1 tablet (25 mg total) by mouth 3 (three) times a day as needed for dizziness 30 tablet 0     No current facility-administered medications on file prior to visit.      Social History     Tobacco Use    Smoking status: Some Days     Types: Cigarettes    Smokeless tobacco: Never    Vaping Use    Vaping status: Never Used   Substance and Sexual Activity    Alcohol use: Not Currently    Drug use: Never    Sexual activity: Yes     Partners: Female        Objective  /74 (BP Location: Left arm, Patient Position: Sitting, Cuff Size: Standard)   Pulse 81   Temp 99 °F (37.2 °C) (Temporal)   Ht 6' (1.829 m)   Wt 96.4 kg (212 lb 9.6 oz)   SpO2 99%   BMI 28.83 kg/m²      Physical Exam  Vitals reviewed.   Constitutional:       General: He is not in acute distress.     Appearance: Normal appearance. He is not ill-appearing, toxic-appearing or diaphoretic.   HENT:      Head: Normocephalic and atraumatic.      Right Ear: External ear normal.      Left Ear: External ear normal.   Eyes:      General: No scleral icterus.        Right eye: No discharge.         Left eye: No discharge.   Cardiovascular:      Rate and Rhythm: Normal rate and regular rhythm.      Heart sounds: Normal heart sounds.   Pulmonary:      Effort: Pulmonary effort is normal. No respiratory distress.      Breath sounds: Normal breath sounds. No stridor. No wheezing.   Musculoskeletal:         General: No swelling. Normal range of motion.      Cervical back: Normal range of motion.      Right lower leg: No edema.      Left lower leg: No edema.   Skin:     General: Skin is warm.      Coloration: Skin is not jaundiced or pale.      Findings: No bruising or erythema.      Comments: Large cystic lesion approximately 3 cm diameter on the face/neck region at approximately the junction of the right mandible and right submental region.  Mobile.  Mildly tender.  No evidence of erythema or active drainage.  Consistent with cystic lesion.    Additional posterior lateral cystic lesion of the neck also consistent with epidermal inclusion cyst.  Mild discomfort.  No erythema or active drainage    Lastly there is a cystic lesion of the left axilla.  Mild tenderness.  No significant drainage or erythema noted   Neurological:      General: No  focal deficit present.      Mental Status: He is alert and oriented to person, place, and time.      Cranial Nerves: No cranial nerve deficit.   Psychiatric:         Mood and Affect: Mood normal.         Behavior: Behavior normal.         Thought Content: Thought content normal.         Judgment: Judgment normal.

## 2024-11-19 NOTE — ASSESSMENT & PLAN NOTE
Symptomatic sebaceous cyst of the left axilla.  Patient was to have this excised.  I do think this is reasonable we will we will excise this under sedation in the OR along with the additional epidermal inclusion cyst of the neck and face.  The procedure itself including associated risk were discussed including bleeding, infection, recurrence. Patient verbalized understands risk and is is willing to proceed, consent was signed. Patient requires no additional workup at this time.      Orders:    Ambulatory Referral to General Surgery

## 2024-11-19 NOTE — PROGRESS NOTES
Name: Jeffrey Vann      : 1970      MRN: [id number]  Encounter Provider: Eleazar Trejo DO  Encounter Date: 2024   Encounter department: [facility]  :  Assessment & Plan  Sebaceous cyst of axilla  Symptomatic sebaceous cyst of the left axilla.  Patient was to have this excised.  I do think this is reasonable we will we will excise this under sedation in the OR along with the additional epidermal inclusion cyst of the neck and face.  The procedure itself including associated risk were discussed including bleeding, infection, recurrence. Patient verbalized understands risk and is is willing to proceed, consent was signed. Patient requires no additional workup at this time.      Orders:    Ambulatory Referral to General Surgery    Sebaceous cyst    Orders:    Ambulatory Referral to General Surgery    Epidermal inclusion cyst  Symptomatic epidermal inclusion cyst of the right submental/neck region and an additional cystic lesion in the right posterior lateral neck..  Patient wishes to have this excised.  I do think this is reasonable,  we will excise this under sedation in the OR. The procedure itself including associated risk were discussed including bleeding, infection, recurrence. Patient verbalized understands risk and is is willing to proceed, consent was signed. Patient requires no additional workup at this time.           Notes:    Recent PCP note dated 2024 was personally reviewed by me.    Imaging:    Ultrasound the head and neck dated  in addition to the ultrasound the left axilla dated  reports were personally reviewed by me.    Cardiology:    EKG dated 2024 was personally reviewed by me.      History of Present Illness     HPI  Jeffrey Vann is a 54 y.o. male who presents today in consultation for multiple cystic lesions of the face/neck and left axilla.  Patient was seen by PCP earlier this month.  He was sent for  ultrasound which did confirm cystic lesions of the face/neck and the left axilla.  Patient states that the cysts are particularly bothersome.  The one on the posterior neck and causes some discomfort especially when he gets his hair done.  The one underneath his chin/mandible region also causes him some significant discomfort occasionally and he would like to have it removed.  Furthermore he has a new cyst he states on his left armpit which also causes him occasional discomfort.  Patient wishes to have all of the cyst removed.  Denies any active infection.  No drainage.    History obtained from: patient    Review of Systems    Review of systems completed, all negative except as notable HPI.    Past Medical History   Past Medical History:   Diagnosis Date    Asthma     Disease of thyroid gland     PTSD (post-traumatic stress disorder)      Past Surgical History:   Procedure Laterality Date    HERNIA REPAIR      2013    WRIST ARTHROPLASTY Right     2015     Family History   Problem Relation Age of Onset    Diabetes Father     Asthma Father       reports that he has been smoking cigarettes. He has never used smokeless tobacco. He reports that he does not currently use alcohol. He reports that he does not use drugs.  Current Outpatient Medications on File Prior to Visit   Medication Sig Dispense Refill    meclizine (ANTIVERT) 25 mg tablet Take 1 tablet (25 mg total) by mouth 3 (three) times a day as needed for dizziness 30 tablet 0     No current facility-administered medications on file prior to visit.   No Known Allergies   Current Outpatient Medications on File Prior to Visit   Medication Sig Dispense Refill    meclizine (ANTIVERT) 25 mg tablet Take 1 tablet (25 mg total) by mouth 3 (three) times a day as needed for dizziness 30 tablet 0     No current facility-administered medications on file prior to visit.      Social History     Tobacco Use    Smoking status: Some Days     Types: Cigarettes    Smokeless tobacco:  Never   Vaping Use    Vaping status: Never Used   Substance and Sexual Activity    Alcohol use: Not Currently    Drug use: Never    Sexual activity: Yes     Partners: Female        Objective   /74 (BP Location: Left arm, Patient Position: Sitting, Cuff Size: Standard)   Pulse 81   Temp 99 °F (37.2 °C) (Temporal)   Ht 6' (1.829 m)   Wt 96.4 kg (212 lb 9.6 oz)   SpO2 99%   BMI 28.83 kg/m²      Physical Exam  Vitals reviewed.   Constitutional:       General: He is not in acute distress.     Appearance: Normal appearance. He is not ill-appearing, toxic-appearing or diaphoretic.   HENT:      Head: Normocephalic and atraumatic.      Right Ear: External ear normal.      Left Ear: External ear normal.   Eyes:      General: No scleral icterus.        Right eye: No discharge.         Left eye: No discharge.   Cardiovascular:      Rate and Rhythm: Normal rate and regular rhythm.      Heart sounds: Normal heart sounds.   Pulmonary:      Effort: Pulmonary effort is normal. No respiratory distress.      Breath sounds: Normal breath sounds. No stridor. No wheezing.   Musculoskeletal:         General: No swelling. Normal range of motion.      Cervical back: Normal range of motion.      Right lower leg: No edema.      Left lower leg: No edema.   Skin:     General: Skin is warm.      Coloration: Skin is not jaundiced or pale.      Findings: No bruising or erythema.      Comments: Large cystic lesion approximately 3 cm diameter on the face/neck region at approximately the junction of the right mandible and right submental region.  Mobile.  Mildly tender.  No evidence of erythema or active drainage.  Consistent with cystic lesion.    Additional posterior lateral cystic lesion of the neck also consistent with epidermal inclusion cyst.  Mild discomfort.  No erythema or active drainage    Lastly there is a cystic lesion of the left axilla.  Mild tenderness.  No significant drainage or erythema noted   Neurological:       General: No focal deficit present.      Mental Status: He is alert and oriented to person, place, and time.      Cranial Nerves: No cranial nerve deficit.   Psychiatric:         Mood and Affect: Mood normal.         Behavior: Behavior normal.         Thought Content: Thought content normal.         Judgment: Judgment normal.

## 2024-11-26 NOTE — PRE-PROCEDURE INSTRUCTIONS
Pre-Surgery Instructions:   Medication Instructions    meclizine (ANTIVERT) 25 mg tablet Take Day of Surgery; unless usually taken at night - PRN    Medication instructions for day surgery reviewed. Please use only a sip of water to take your instructed medications. Avoid all over the counter vitamins, supplements and NSAIDS for one week prior to surgery per anesthesia guidelines. Tylenol is ok to take as needed.     You will receive a call one business day prior to surgery with an arrival time and hospital directions. If your surgery is scheduled on a Monday, the hospital will be calling you on the Friday prior to your surgery. If you have not heard from anyone by 8pm, please call the hospital supervisor through the hospital  at [phone]. ([place] [phone] or [place] [phone]).    Do not eat or drink anything after midnight the night before your surgery, including candy, mints, lifesavers, or chewing gum. Do not drink alcohol 24hrs before your surgery. Try not to smoke at least 24hrs before your surgery.       Follow the pre surgery showering instructions as listed in the “My Surgical Experience Booklet” or otherwise provided by your surgeon's office. Do not use a blade to shave the surgical area 1 week before surgery. It is okay to use a clean electric clippers up to 24 hours before surgery. Do not apply any lotions, creams, including makeup, cologne, deodorant, or perfumes after showering on the day of your surgery. Do not use dry shampoo, hair spray, hair gel, or any type of hair products.     No contact lenses, eye make-up, or artificial eyelashes. Remove nail polish, including gel polish, and any artificial, gel, or acrylic nails if possible. Remove all jewelry including rings and body piercing jewelry.     Wear causal clothing that is easy to take on and off. Consider your type of surgery.    Keep any valuables, jewelry, piercings at home. Please bring any specially ordered equipment  (sling, braces) if indicated.    Arrange for a responsible person to drive you to and from the hospital on the day of your surgery. Please confirm the visitor policy for the day of your procedure when you receive your phone call with an arrival time.     Call the surgeon's office with any new illnesses, exposures, or additional questions prior to surgery.    Please reference your “My Surgical Experience Booklet” for additional information to prepare for your upcoming surgery.

## 2024-11-28 ENCOUNTER — TELEPHONE (OUTPATIENT)
Dept: OTHER | Facility: OTHER | Age: 54
End: 2024-11-28

## 2024-11-28 NOTE — TELEPHONE ENCOUNTER
Pt just wanted to confirm that his biopy procedure was for December 6. Pt received a phone call about his follow up appt for December 19th and was a bit confused. Clarified with pt that his procedure is still scheduled for Dec 6. Pt verbalized understanding.

## 2024-12-05 ENCOUNTER — ANESTHESIA EVENT (OUTPATIENT)
Dept: PERIOP | Facility: HOSPITAL | Age: 54
End: 2024-12-05
Payer: COMMERCIAL

## 2024-12-06 ENCOUNTER — HOSPITAL ENCOUNTER (OUTPATIENT)
Facility: HOSPITAL | Age: 54
Setting detail: OUTPATIENT SURGERY
Discharge: HOME | End: 2024-12-06
Attending: SURGERY | Admitting: SURGERY
Payer: COMMERCIAL

## 2024-12-06 ENCOUNTER — ANESTHESIA (OUTPATIENT)
Dept: PERIOP | Facility: HOSPITAL | Age: 54
End: 2024-12-06
Payer: COMMERCIAL

## 2024-12-06 VITALS
OXYGEN SATURATION: 98 % | HEART RATE: 61 BPM | HEIGHT: 72 IN | TEMPERATURE: 97.6 F | SYSTOLIC BLOOD PRESSURE: 150 MMHG | WEIGHT: 212 LBS | RESPIRATION RATE: 18 BRPM | DIASTOLIC BLOOD PRESSURE: 83 MMHG | BODY MASS INDEX: 28.71 KG/M2

## 2024-12-06 DIAGNOSIS — L72.0 EPIDERMAL INCLUSION CYST: ICD-10-CM

## 2024-12-06 DIAGNOSIS — L72.3 SEBACEOUS CYST OF AXILLA: ICD-10-CM

## 2024-12-06 PROCEDURE — 11422 EXC H-F-NK-SP B9+MARG 1.1-2: CPT | Performed by: PHYSICIAN ASSISTANT

## 2024-12-06 PROCEDURE — 11422 EXC H-F-NK-SP B9+MARG 1.1-2: CPT | Performed by: SURGERY

## 2024-12-06 PROCEDURE — 12042 INTMD RPR N-HF/GENIT2.6-7.5: CPT | Performed by: SURGERY

## 2024-12-06 PROCEDURE — 11443 EXC FACE-MM B9+MARG 2.1-3 CM: CPT | Performed by: PHYSICIAN ASSISTANT

## 2024-12-06 PROCEDURE — NC001 PR NO CHARGE: Performed by: PHYSICIAN ASSISTANT

## 2024-12-06 PROCEDURE — 88304 TISSUE EXAM BY PATHOLOGIST: CPT | Performed by: STUDENT IN AN ORGANIZED HEALTH CARE EDUCATION/TRAINING PROGRAM

## 2024-12-06 PROCEDURE — 12042 INTMD RPR N-HF/GENIT2.6-7.5: CPT | Performed by: PHYSICIAN ASSISTANT

## 2024-12-06 PROCEDURE — 11402 EXC TR-EXT B9+MARG 1.1-2 CM: CPT | Performed by: PHYSICIAN ASSISTANT

## 2024-12-06 PROCEDURE — 11402 EXC TR-EXT B9+MARG 1.1-2 CM: CPT | Performed by: SURGERY

## 2024-12-06 PROCEDURE — 11443 EXC FACE-MM B9+MARG 2.1-3 CM: CPT | Performed by: SURGERY

## 2024-12-06 RX ORDER — ONDANSETRON 2 MG/ML
4 INJECTION INTRAMUSCULAR; INTRAVENOUS ONCE AS NEEDED
Status: DISCONTINUED | OUTPATIENT
Start: 2024-12-06 | End: 2024-12-06 | Stop reason: HOSPADM

## 2024-12-06 RX ORDER — SODIUM CHLORIDE, SODIUM LACTATE, POTASSIUM CHLORIDE, CALCIUM CHLORIDE 600; 310; 30; 20 MG/100ML; MG/100ML; MG/100ML; MG/100ML
125 INJECTION, SOLUTION INTRAVENOUS CONTINUOUS
Status: DISCONTINUED | OUTPATIENT
Start: 2024-12-06 | End: 2024-12-06 | Stop reason: HOSPADM

## 2024-12-06 RX ORDER — MAGNESIUM HYDROXIDE 1200 MG/15ML
LIQUID ORAL AS NEEDED
Status: DISCONTINUED | OUTPATIENT
Start: 2024-12-06 | End: 2024-12-06 | Stop reason: HOSPADM

## 2024-12-06 RX ORDER — LIDOCAINE HYDROCHLORIDE 10 MG/ML
INJECTION, SOLUTION EPIDURAL; INFILTRATION; INTRACAUDAL; PERINEURAL AS NEEDED
Status: DISCONTINUED | OUTPATIENT
Start: 2024-12-06 | End: 2024-12-06

## 2024-12-06 RX ORDER — BUPIVACAINE HYDROCHLORIDE 5 MG/ML
INJECTION, SOLUTION EPIDURAL; INTRACAUDAL AS NEEDED
Status: DISCONTINUED | OUTPATIENT
Start: 2024-12-06 | End: 2024-12-06 | Stop reason: HOSPADM

## 2024-12-06 RX ORDER — ONDANSETRON 2 MG/ML
INJECTION INTRAMUSCULAR; INTRAVENOUS AS NEEDED
Status: DISCONTINUED | OUTPATIENT
Start: 2024-12-06 | End: 2024-12-06

## 2024-12-06 RX ORDER — CEFAZOLIN SODIUM 2 G/50ML
2000 SOLUTION INTRAVENOUS ONCE
Status: COMPLETED | OUTPATIENT
Start: 2024-12-06 | End: 2024-12-06

## 2024-12-06 RX ORDER — SODIUM CHLORIDE, SODIUM LACTATE, POTASSIUM CHLORIDE, CALCIUM CHLORIDE 600; 310; 30; 20 MG/100ML; MG/100ML; MG/100ML; MG/100ML
100 INJECTION, SOLUTION INTRAVENOUS CONTINUOUS
Status: DISCONTINUED | OUTPATIENT
Start: 2024-12-06 | End: 2024-12-06 | Stop reason: HOSPADM

## 2024-12-06 RX ORDER — HEPARIN SODIUM 5000 [USP'U]/ML
5000 INJECTION, SOLUTION INTRAVENOUS; SUBCUTANEOUS ONCE
Status: COMPLETED | OUTPATIENT
Start: 2024-12-06 | End: 2024-12-06

## 2024-12-06 RX ORDER — PROPOFOL 10 MG/ML
INJECTION, EMULSION INTRAVENOUS AS NEEDED
Status: DISCONTINUED | OUTPATIENT
Start: 2024-12-06 | End: 2024-12-06

## 2024-12-06 RX ORDER — HYDROMORPHONE HCL/PF 1 MG/ML
0.5 SYRINGE (ML) INJECTION
Status: DISCONTINUED | OUTPATIENT
Start: 2024-12-06 | End: 2024-12-06 | Stop reason: HOSPADM

## 2024-12-06 RX ORDER — FENTANYL CITRATE/PF 50 MCG/ML
25 SYRINGE (ML) INJECTION
Status: DISCONTINUED | OUTPATIENT
Start: 2024-12-06 | End: 2024-12-06 | Stop reason: HOSPADM

## 2024-12-06 RX ORDER — IBUPROFEN 800 MG/1
800 TABLET, FILM COATED ORAL EVERY 6 HOURS PRN
Status: DISCONTINUED | OUTPATIENT
Start: 2024-12-06 | End: 2024-12-06 | Stop reason: HOSPADM

## 2024-12-06 RX ORDER — MIDAZOLAM HYDROCHLORIDE 2 MG/2ML
INJECTION, SOLUTION INTRAMUSCULAR; INTRAVENOUS AS NEEDED
Status: DISCONTINUED | OUTPATIENT
Start: 2024-12-06 | End: 2024-12-06

## 2024-12-06 RX ORDER — OXYCODONE AND ACETAMINOPHEN 5; 325 MG/1; MG/1
2 TABLET ORAL EVERY 4 HOURS PRN
Refills: 0 | Status: DISCONTINUED | OUTPATIENT
Start: 2024-12-06 | End: 2024-12-06 | Stop reason: HOSPADM

## 2024-12-06 RX ORDER — DEXAMETHASONE SODIUM PHOSPHATE 10 MG/ML
INJECTION, SOLUTION INTRAMUSCULAR; INTRAVENOUS AS NEEDED
Status: DISCONTINUED | OUTPATIENT
Start: 2024-12-06 | End: 2024-12-06

## 2024-12-06 RX ORDER — ONDANSETRON 2 MG/ML
4 INJECTION INTRAMUSCULAR; INTRAVENOUS EVERY 8 HOURS PRN
Status: DISCONTINUED | OUTPATIENT
Start: 2024-12-06 | End: 2024-12-06 | Stop reason: HOSPADM

## 2024-12-06 RX ORDER — FENTANYL CITRATE 50 UG/ML
INJECTION, SOLUTION INTRAMUSCULAR; INTRAVENOUS AS NEEDED
Status: DISCONTINUED | OUTPATIENT
Start: 2024-12-06 | End: 2024-12-06

## 2024-12-06 RX ORDER — GINSENG 100 MG
CAPSULE ORAL AS NEEDED
Status: DISCONTINUED | OUTPATIENT
Start: 2024-12-06 | End: 2024-12-06 | Stop reason: HOSPADM

## 2024-12-06 RX ORDER — ROCURONIUM BROMIDE 10 MG/ML
INJECTION, SOLUTION INTRAVENOUS AS NEEDED
Status: DISCONTINUED | OUTPATIENT
Start: 2024-12-06 | End: 2024-12-06

## 2024-12-06 RX ADMIN — DEXAMETHASONE SODIUM PHOSPHATE 10 MG: 10 INJECTION, SOLUTION INTRAMUSCULAR; INTRAVENOUS at 10:47

## 2024-12-06 RX ADMIN — HEPARIN SODIUM 5000 UNITS: 5000 INJECTION, SOLUTION INTRAVENOUS; SUBCUTANEOUS at 08:18

## 2024-12-06 RX ADMIN — ROCURONIUM BROMIDE 10 MG: 10 INJECTION, SOLUTION INTRAVENOUS at 11:23

## 2024-12-06 RX ADMIN — ROCURONIUM BROMIDE 40 MG: 10 INJECTION, SOLUTION INTRAVENOUS at 10:42

## 2024-12-06 RX ADMIN — LIDOCAINE HYDROCHLORIDE 100 MG: 10 INJECTION, SOLUTION EPIDURAL; INFILTRATION; INTRACAUDAL; PERINEURAL at 10:41

## 2024-12-06 RX ADMIN — FENTANYL CITRATE 50 MCG: 50 INJECTION, SOLUTION INTRAMUSCULAR; INTRAVENOUS at 11:23

## 2024-12-06 RX ADMIN — ONDANSETRON 4 MG: 2 INJECTION INTRAMUSCULAR; INTRAVENOUS at 10:59

## 2024-12-06 RX ADMIN — MIDAZOLAM HYDROCHLORIDE 2 MG: 1 INJECTION, SOLUTION INTRAMUSCULAR; INTRAVENOUS at 10:30

## 2024-12-06 RX ADMIN — PROPOFOL 50 MG: 10 INJECTION, EMULSION INTRAVENOUS at 12:04

## 2024-12-06 RX ADMIN — SODIUM CHLORIDE, SODIUM LACTATE, POTASSIUM CHLORIDE, AND CALCIUM CHLORIDE 100 ML/HR: .6; .31; .03; .02 INJECTION, SOLUTION INTRAVENOUS at 08:38

## 2024-12-06 RX ADMIN — PROPOFOL 200 MG: 10 INJECTION, EMULSION INTRAVENOUS at 10:41

## 2024-12-06 RX ADMIN — SUGAMMADEX 200 MG: 100 INJECTION, SOLUTION INTRAVENOUS at 12:06

## 2024-12-06 RX ADMIN — CEFAZOLIN SODIUM 2000 MG: 2 SOLUTION INTRAVENOUS at 10:46

## 2024-12-06 RX ADMIN — FENTANYL CITRATE 50 MCG: 50 INJECTION, SOLUTION INTRAMUSCULAR; INTRAVENOUS at 10:30

## 2024-12-06 NOTE — ANESTHESIA PREPROCEDURE EVALUATION
Procedure:  EXCISION BIOPSY LESION /MASS FACIAL/NECK x 2 (Right: Face)  EXCISION BIOPSY TISSUE LESION/MASS UPPER EXTREMITY (Left: Axilla)    Relevant Problems   MUSCULOSKELETAL   (+) Acute left-sided low back pain with left-sided sciatica      Asthma  Thyroid disease  PTSD  Daily smoker    Physical Exam    Airway    Mallampati score: II  TM Distance: >3 FB  Neck ROM: full     Dental        Cardiovascular  Cardiovascular exam normal    Pulmonary  Pulmonary exam normal     Other Findings      Anesthesia Plan  ASA Score- 2     Anesthesia Type- general with ASA Monitors.         Additional Monitors:     Airway Plan:            Plan Factors-    Chart reviewed. EKG reviewed.  Existing labs reviewed. Patient summary reviewed.                  Induction- intravenous.    Postoperative Plan-         Informed Consent- Anesthetic plan and risks discussed with patient.  I personally reviewed this patient with the CRNA. Discussed and agreed on the Anesthesia Plan with the CRNA..

## 2024-12-06 NOTE — DISCHARGE INSTR - AVS FIRST PAGE
Follow-up with Dr. Trejo within the next 10 days to have sutures removed from the face.    Regular diet as tolerated.    Low intensity activity as tolerated.  No heavy lifting with the left arm, nothing greater than 15 pounds for 2 weeks.    The dressing on your face may be removed on Sunday.  May shower on Sunday.  No baths or swimming.  You do not need to remove the dressings from your armpit or your neck as those have surgical glue.  This will fall off on its own.    You may take Tylenol and/or ibuprofen for the pain.    If develop fever, nausea vomiting, worsening pain, redness or drainage from incision call the office or go to the ER.

## 2024-12-06 NOTE — INTERVAL H&P NOTE
H&P reviewed. After examining the patient I find no changes in the patients condition since the H&P had been written.    Vitals:    12/06/24 0811   BP: 114/74   Pulse: 62   Resp: 20   Temp: (!) 97 °F (36.1 °C)   SpO2: 97%

## 2024-12-06 NOTE — OP NOTE
OPERATIVE REPORT  PATIENT NAME: Jeffrey Vann    :  1970  MRN: [id number]  Pt Location: [facility] OR ROOM 01    SURGERY DATE: 2024    Surgeons and Role:     * Eleazar Trejo DO - Primary     * Michelle Spear PA-C - Assisting    Preop Diagnosis:  Sebaceous cyst of axilla [L72.3]  Epidermal inclusion cyst [L72.0]    Post-Op Diagnosis Codes:     * Sebaceous cyst of axilla [L72.3]     * Epidermal inclusion cyst [L72.0]    Procedure(s):  Right - EXCISION BIOPSY LESION /MASS RIGHT FACE AND RIGHT POSTERIOR NECK  Left - EXCISION BIOPSY TISSUE LESION/MASS UPPER EXTREMITY    Specimen(s):  ID Type Source Tests Collected by Time Destination   1 : cystic lesion right posterior neck Tissue Cyst TISSUE EXAM Eleazar Trejo DO 2024 1110    2 : left axilla cyst Tissue Cyst TISSUE EXAM Michelle Spear PA-C 2024 1143    3 : right face cyst Tissue Cyst TISSUE EXAM Eleazar Trejo DO 2024 1146        Estimated Blood Loss:   Minimal    Drains:  * No LDAs found *    Anesthesia Type:   General/LMA    Operative Indications:  Sebaceous cyst of axilla [L72.3]  Epidermal inclusion cyst [L72.0]      Operative Findings:  Cystic lesion of right posterior neck.  Incision measuring 2.5 cm.  Lesion measured approximately 1.5 cm with 1 mm margin    Cyst of left axilla.  Incision measuring approximately 2.2 cm.  Lesion measured approximately 1.3 cm.  Margins 1 mm.    Cystic lesion of the right face just beneath the mandible.  Incision measuring 3.3 cm.  Lesion measured approximately 2.5 cm.  1 mm margins      Complications:   None    Procedure and Technique:  Patient was brought to the operating room and placed in supine position on the operating table.  All the regular monitor vices were then connected.  The patient underwent general esthesia with endotracheal the patient without complication.  The patient received perioperative device.  The patient received subcutaneous heparin addition the bilateral lower sequential  crystalloids for DVT prophylaxis.  Patient was then repositioned to the lateral decubitus with the right side up.  Care was taken make sure that all pressure points were padded and the ET tube remained in place.  The patient is then prepped draped in usual sterile fashion for the right posterior neck cystic lesion.  A timeout was performed to verify correct patient procedure, position, site.  An elliptical incision was made approximately 2.5 cm in length.  This was carried down through the dermis until the cystic lesion was in [place].  Where there is a white noted capsule.  Dissection continued circumferentially to the lesion was removed in its entirety.  The cavity is irrigated.  Hemostasis was achieved.  The incision was closed in layers using 3-0 Vicryl for the dermis and a running 4-0 Monocryl for the subcuticular layer.  The surgical glue was then applied.    The patient was then repositioned to the supine position.  The lesions of the right face just under the right mandible were noted in addition to the left axilla.  Both lesions were then prepped draped usual sterile fashion.  Timeout was then reengaged to confirm those were the correct lesions.  The neck lesion was tackled first.  An oblique ellipse incision along the jawline was made approximately 3.3 cm with a 15 blade scalpel.  This was carried down to subcutaneous fat.  The cystic lesion at this point was in [place].  Noted white capsule.  This was circumferentially dissected.  In doing so a small hole was made in the capsule due to some dense adhesions.  Small amount of cheeselike material leaked out consistent with cystic lesion.  This was quickly wiped off.  The cystic lesion was then circumferentially dissected and passed off the field.  The cavity was then irrigated with copious amounts normal saline.  Hemostasis was achieved with a combination of Bovie electrocautery and direct pressure.  The incision was then closed with interrupted 5  this-year-old nylon sutures.  Bacitracin and a dry sterile dressing then placed.  Tension was then turned to the left axilla.  An approximately 2.2 cm incision and ellipse fashion was then made around the a 1.3 cm lesion.  This was carried down to subcutaneous fat into the lesion was completely dissected and passed off the field.  Hemostasis was achieved with direct pressure and Bovie electrocautery.  The cavity was irrigated normal saline.  The incision was then repaired in layers.  3 history of Vicryl for the deep dermis and a running 4-0 Monocryl for the subcuticular layer.  Surgical glue was then placed.    The patient Toller procedure well taken the postanesthesia care unit in stable condition.  All lap, needle, instrument counts were correct.       I was present for the entire procedure., A qualified resident physician was not available., and A physician assistant was required during the procedure for retraction, tissue handling, dissection and suturing.    Patient Disposition:  PACU              SIGNATURE: Eleazar Trejo DO  DATE: December 6, 2024  TIME: 12:19 PM

## 2024-12-06 NOTE — ANESTHESIA POSTPROCEDURE EVALUATION
Post-Op Assessment Note    CV Status:  Stable  Pain Score: 0    Pain management: adequate       Mental Status:  Alert and awake   Hydration Status:  Euvolemic   PONV Controlled:  Controlled   Airway Patency:  Patent     Post Op Vitals Reviewed: Yes    No anethesia notable event occurred.    Staff: Anesthesiologist, CRNA           Last Filed PACU Vitals:  Vitals Value Taken Time   Temp 97.2 °F (36.2 °C) 12/06/24 1218   Pulse 61 12/06/24 1251   /78 12/06/24 1250   Resp 17 12/06/24 1251   SpO2 100 % 12/06/24 1251   Vitals shown include unfiled device data.    Modified Mian:  Activity: 2 (12/6/2024 12:45 PM)  Respiration: 2 (12/6/2024 12:45 PM)  Circulation: 2 (12/6/2024 12:45 PM)  Consciousness: 1 (12/6/2024 12:45 PM)  Oxygen Saturation: 2 (12/6/2024 12:45 PM)  Modified Mian Score: 9 (12/6/2024 12:45 PM)

## 2024-12-10 ENCOUNTER — RESULTS FOLLOW-UP (OUTPATIENT)
Dept: OTHER | Facility: HOSPITAL | Age: 54
End: 2024-12-10

## 2024-12-10 PROCEDURE — 88304 TISSUE EXAM BY PATHOLOGIST: CPT | Performed by: STUDENT IN AN ORGANIZED HEALTH CARE EDUCATION/TRAINING PROGRAM

## 2024-12-19 ENCOUNTER — OFFICE VISIT (OUTPATIENT)
Dept: SURGERY | Facility: CLINIC | Age: 54
End: 2024-12-19

## 2024-12-19 VITALS
DIASTOLIC BLOOD PRESSURE: 64 MMHG | BODY MASS INDEX: 29.39 KG/M2 | WEIGHT: 217 LBS | SYSTOLIC BLOOD PRESSURE: 112 MMHG | HEIGHT: 72 IN | TEMPERATURE: 98.3 F | OXYGEN SATURATION: 98 % | HEART RATE: 68 BPM

## 2024-12-19 DIAGNOSIS — L72.0 EPIDERMAL INCLUSION CYST: Primary | ICD-10-CM

## 2024-12-19 PROCEDURE — 99024 POSTOP FOLLOW-UP VISIT: CPT

## 2024-12-19 NOTE — ASSESSMENT & PLAN NOTE
54-year-old male seen for postop follow-up and suture removal, underwent excision of 3 epidermal inclusion cysts of the right submental, neck and cyst of the right posterior lateral neck performed in the OR by Dr. Eleazar Trejo on 12/6/2024.  Overall patient doing well.  Denies any wound healing problems or drainage.  Sutures remain in the right submental area which need to be removed in the office here today.    All incisions clean and dry.  Interrupted nylon sutures removed from the right submental jaw area, difficult at first because the patient has coarse facial beard hair.  All sutures were successfully removed.  No wound disruption.  A small area of facial beard here had to be clipped for adequate inspection of the wound and to help ensure that all sutures were removed successfully.  Wound was cleansed with alcohol, no bleeding.  May leave open to air.    Surgical pathology report reviewed with the patient, benign.  Epidermal inclusion cyst x 3.    No further follow-up with general surgery required beyond this point.    Gilmar Garcia PA-C    Surgical Pathology Report                         Case: [id number]                                   Authorizing Provider:  Eleazar Trejo DO          Collected:           12/06/2024 1110               Ordering Location:     [facility] Received:            12/06/2024 1421                                      Operating Room                                                               Pathologist:           Sean Sanchez MD                                                           Specimens:   A) - Cyst, cystic lesion right posterior neck                                                        B) - Cyst, left axilla cyst                                                                          C) - Cyst, right face cyst                                                                Final Diagnosis   A. Cyst, posterior neck, right, excision:  -   Epidermal  inclusion cyst.      B. Cyst, axilla, left, excision:  -   Epidermal inclusion cyst.      C. Cyst, face, right, excision:  -   Epidermal inclusion cyst.            OPERATIVE REPORT  PATIENT NAME: Jeffrey Vann    :  1970  MRN: [id number]  Pt Location: [facility] OR ROOM 01     SURGERY DATE: 2024     Surgeons and Role:     * Eleazar Trejo DO - Primary     * Michelle Spear PA-C - Assisting     Preop Diagnosis:  Sebaceous cyst of axilla [L72.3]  Epidermal inclusion cyst [L72.0]     Post-Op Diagnosis Codes:     * Sebaceous cyst of axilla [L72.3]     * Epidermal inclusion cyst [L72.0]     Procedure(s):  Right - EXCISION BIOPSY LESION /MASS RIGHT FACE AND RIGHT POSTERIOR NECK  Left - EXCISION BIOPSY TISSUE LESION/MASS UPPER EXTREMITY

## 2024-12-19 NOTE — PROGRESS NOTES
Name: Jeffrey Vann      : 1970      MRN: [id number]  Encounter Provider: Gilmar Garcia PA-C  Encounter Date: 2024   Encounter department: [facility]  :  Assessment & Plan  Epidermal inclusion cyst  54-year-old male seen for postop follow-up and suture removal, underwent excision of 3 epidermal inclusion cysts of the right submental, neck and cyst of the right posterior lateral neck performed in the OR by Dr. Eleazar Trejo on 2024.  Overall patient doing well.  Denies any wound healing problems or drainage.  Sutures remain in the right submental area which need to be removed in the office here today.    All incisions clean and dry.  Interrupted nylon sutures removed from the right submental jaw area, difficult at first because the patient has coarse facial beard hair.  All sutures were successfully removed.  No wound disruption.  A small area of facial beard here had to be clipped for adequate inspection of the wound and to help ensure that all sutures were removed successfully.  Wound was cleansed with alcohol, no bleeding.  May leave open to air.    Surgical pathology report reviewed with the patient, benign.  Epidermal inclusion cyst x 3.    No further follow-up with general surgery required beyond this point.    Gilmar Garcia PA-C    Surgical Pathology Report                         Case: [id number]                                   Authorizing Provider:  Eleazar Trejo DO          Collected:           2024 1110               [facility] Received:            2024 1421                                      Operating Room                                                               Pathologist:           Sean Sanchez MD                                                           Specimens:   A) - Cyst, cystic lesion right posterior neck                                                        B) - Cyst, left axilla  cyst                                                                          C) - Cyst, right face cyst                                                                Final Diagnosis   A. Cyst, posterior neck, right, excision:  -   Epidermal inclusion cyst.      B. Cyst, axilla, left, excision:  -   Epidermal inclusion cyst.      C. Cyst, face, right, excision:  -   Epidermal inclusion cyst.            OPERATIVE REPORT  PATIENT NAME: Jeffrey Vann    :  1970  MRN: [id number]  Pt Location: [facility] OR ROOM 01     SURGERY DATE: 2024     Surgeons and Role:     * Eleazar Trejo DO - Primary     * Michelle Spear PA-C - Assisting     Preop Diagnosis:  Sebaceous cyst of axilla [L72.3]  Epidermal inclusion cyst [L72.0]     Post-Op Diagnosis Codes:     * Sebaceous cyst of axilla [L72.3]     * Epidermal inclusion cyst [L72.0]     Procedure(s):  Right - EXCISION BIOPSY LESION /MASS RIGHT FACE AND RIGHT POSTERIOR NECK  Left - EXCISION BIOPSY TISSUE LESION/MASS UPPER EXTREMITY    History of Present Illness   HPI  Jeffrey Vann is a 54 y.o. male who presents for postoperative follow-up and suture removal.  He had 3 cyst removed from the right submental, neck and right posterior lateral neck performed in ED 13 days ago.  Offers no complaints at this time.    History obtained from: patient    Review of Systems   Musculoskeletal:  Negative for neck pain and neck stiffness.   Skin:  Positive for wound.   All other systems reviewed and are negative.    Pertinent Medical History   .  Past Medical History   Past Medical History:   Diagnosis Date    Asthma     mild per patient    Disease of thyroid gland     PTSD (post-traumatic stress disorder)      Past Surgical History:   Procedure Laterality Date    HERNIA REPAIR Left     inguinal-     ND EXC TUMOR SOFT TISS UPR ARM/ELBOW SUBFASC <5CM Left 2024    Procedure: EXCISION BIOPSY TISSUE LESION/MASS UPPER EXTREMITY;  Surgeon: Eleazar Trejo DO;  Location: [facility]  OR;  Service: General    [place] EXC TUMOR SOFT TISSUE NECK/THORAX SUBFASC 5 CM/> Right 12/6/2024    Procedure: EXCISION BIOPSY LESION /MASS RIGHT FACE AND RIGHT POSTERIOR NECK;  Surgeon: Eleazar Trejo DO;  Location: [facility];  Service: General    KY EXCISION TUMOR SOFT TISS FACE/SCALP SUBQ 2 CM/> Right 12/6/2024    Procedure: EXCISION BIOPSY LESION /MASS RIGHT FACE AND RIGHT POSTERIOR NECK;  Surgeon: Eleazar Trejo DO;  Location: [facility];  Service: General    WRIST ARTHROPLASTY Right     2015     Family History   Problem Relation Age of Onset    Diabetes Father     Asthma Father       reports that he has been smoking cigarettes. He has never used smokeless tobacco. He reports current alcohol use. He reports that he does not use drugs.  Current Outpatient Medications on File Prior to Visit   Medication Sig Dispense Refill    meclizine (ANTIVERT) 25 mg tablet Take 1 tablet (25 mg total) by mouth 3 (three) times a day as needed for dizziness 30 tablet 0     No current facility-administered medications on file prior to visit.   No Known Allergies   Current Outpatient Medications on File Prior to Visit   Medication Sig Dispense Refill    meclizine (ANTIVERT) 25 mg tablet Take 1 tablet (25 mg total) by mouth 3 (three) times a day as needed for dizziness 30 tablet 0     No current facility-administered medications on file prior to visit.      Social History     Tobacco Use    Smoking status: Some Days     Current packs/day: 0.25     Types: Cigarettes    Smokeless tobacco: Never   Vaping Use    Vaping status: Never Used   Substance and Sexual Activity    Alcohol use: Yes     Comment: rarely    Drug use: Never    Sexual activity: Yes     Partners: Female        Objective   /64 (BP Location: Left arm, Patient Position: Sitting, Cuff Size: Standard)   Pulse 68   Temp 98.3 °F (36.8 °C) (Temporal)   Ht 6' (1.829 m)   Wt 98.4 kg (217 lb)   SpO2 98%   BMI 29.43 kg/m²      Physical Exam  HENT:      Head:  Normocephalic.      Nose: Nose normal.      Mouth/Throat:      Pharynx: Oropharynx is clear.   Cardiovascular:      Heart sounds: Normal heart sounds.   Pulmonary:      Breath sounds: Normal breath sounds.   Abdominal:      General: Bowel sounds are normal.      Tenderness: There is no abdominal tenderness.   Musculoskeletal:      Right lower leg: No edema.      Left lower leg: No edema.   Skin:     Coloration: Skin is not jaundiced or pale.      Findings: No bruising, erythema, lesion or rash.      Comments: Right submental area finds the incision to be clean and dry, multiple interrupted nylon fine sutures at first difficult to identify because the patient has thick coarse facial hair.  All sutures were successfully removed.  Small area had to be shaved around the incision to help visualize to ensure all sutures have been identified.  Incision cleansed with alcohol  swabs, no sign of any erythema, no drainage or fluctuance.  No bleeding.  Left open to air.  Right lower neck and left upper extremity incisions all clean and dry closed with skin glue.  Nontender.  No redness or drainage expressed with palpation.   Neurological:      Mental Status: He is alert.      Gait: Gait normal.   Psychiatric:         Mood and Affect: Mood normal.         Thought Content: Thought content normal.       Administrative Statements   I have spent a total time of 20 minutes in caring for this patient on the day of the visit/encounter including Diagnostic results, Prognosis, Risks and benefits of tx options, Instructions for management, Patient and family education, Importance of tx compliance, Risk factor reductions, Impressions, Counseling / Coordination of care, Documenting in the medical record, Reviewing / ordering tests, medicine, procedures  , and Obtaining or reviewing history  .     Gilmar Garcia PA-C

## 2025-01-02 ENCOUNTER — TELEPHONE (OUTPATIENT)
Age: 55
End: 2025-01-02

## 2025-01-02 NOTE — TELEPHONE ENCOUNTER
Contacted patient off of Medication Management  to verify needs of services in attempts to offer patient an appointment. LVM for patient to contact intake dept  in regards to wait list

## 2025-01-13 ENCOUNTER — TELEPHONE (OUTPATIENT)
Dept: PSYCHIATRY | Facility: CLINIC | Age: 55
End: 2025-01-13

## 2025-01-14 ENCOUNTER — OFFICE VISIT (OUTPATIENT)
Dept: OTOLARYNGOLOGY | Facility: CLINIC | Age: 55
End: 2025-01-14

## 2025-01-14 VITALS
OXYGEN SATURATION: 99 % | BODY MASS INDEX: 27.04 KG/M2 | HEART RATE: 56 BPM | RESPIRATION RATE: 16 BRPM | WEIGHT: 199.6 LBS | TEMPERATURE: 97.7 F | DIASTOLIC BLOOD PRESSURE: 86 MMHG | SYSTOLIC BLOOD PRESSURE: 120 MMHG | HEIGHT: 72 IN

## 2025-01-14 DIAGNOSIS — R42 VERTIGO: ICD-10-CM

## 2025-01-14 DIAGNOSIS — H61.21 IMPACTED CERUMEN OF RIGHT EAR: Primary | ICD-10-CM

## 2025-01-14 NOTE — PROGRESS NOTES
ASSESSMENT:        1. Impacted cerumen of right ear        2. Vertigo             PLAN: Recommend patient return in 6 months for cerumen check.  If dizziness recurs, consider VNG.        Patient ID: Jeffrey Vann is a 54 y.o. male.    SUBJECTIVE: The patient returns for cerumen check as well as follow-up for vertigo.  He states he did well following physical therapy in May 2024, but had recurrence of symptoms in October 2024, returned to , and is presently asymptomatic.      OBJECTIVE:    Vitals:    01/14/25 0951   BP: 120/86   BP Location: Left arm   Patient Position: Sitting   Cuff Size: Large   Pulse: 56   Resp: 16   Temp: 97.7 °F (36.5 °C)   TempSrc: Temporal   SpO2: 99%   Weight: 90.5 kg (199 lb 9.6 oz)   Height: 6' (1.829 m)        Physical Exam   Gait steady.  No nystagmus OU.  Auricles normal.  The right external auditory canal is impacted with cerumen.  The left EAC is patent.  Following cerumen removal, tympanic membranes appear grossly intact, without discernible middle ear effusions.  Oral mucosa moist.  Oropharynx clear.  No palpable periauricular or cervical lymphadenopathy.    PROCEDURE:     Cerumen removal.  Impacted cerumen removal was performed by me and removed from the Right ear.  The visualization instrument used was the operating otoscope and speculum. The ear cleaning instrument used was an ear curette. The outcome was successful and the patient tolerated the procedure well. There were no complications.

## 2025-02-28 ENCOUNTER — TELEPHONE (OUTPATIENT)
Age: 55
End: 2025-02-28

## 2025-02-28 NOTE — TELEPHONE ENCOUNTER
Reached out to pt in regards to Talk Therapy wait list and to [facility] to contact intake dept.     First attempt to schedule.     Upon return call please warm Transfer to [facility] or [facility].

## 2025-03-14 ENCOUNTER — TELEMEDICINE (OUTPATIENT)
Dept: PSYCHIATRY | Facility: CLINIC | Age: 55
End: 2025-03-14

## 2025-03-14 DIAGNOSIS — F41.1 GAD (GENERALIZED ANXIETY DISORDER): Primary | ICD-10-CM

## 2025-03-14 DIAGNOSIS — F33.1 MDD (MAJOR DEPRESSIVE DISORDER), RECURRENT EPISODE, MODERATE (HCC): ICD-10-CM

## 2025-03-14 DIAGNOSIS — F43.10 PTSD (POST-TRAUMATIC STRESS DISORDER): ICD-10-CM

## 2025-03-14 RX ORDER — QUETIAPINE FUMARATE 25 MG/1
25 TABLET, FILM COATED ORAL
Qty: 30 TABLET | Refills: 1 | Status: SHIPPED | OUTPATIENT
Start: 2025-03-14
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
## 2025-03-14 NOTE — BH TREATMENT PLAN
TREATMENT PLAN (Medication Management Only)        [facility]    Name and Date of Birth:  Jeffrey Vann 55 y.o. 1970  MRN: [id number]  Date of Treatment Plan: March 14, 2025  Diagnosis/Diagnoses:    1. TRANG (generalized anxiety disorder)    2. MDD (major depressive disorder), recurrent episode, moderate (HCC)    3. PTSD (post-traumatic stress disorder)      Strengths/Personal Resources for Self-Care: ability to communicate needs, ability to communicate well, ability to listen, ability to reason, good physical health, willingness to work on problems.  Area/Areas of need (in own words): anxiety symptoms, depressive symptoms  1. Long Term Goal:   improve control of acceptable anxiety level, improve control of depression.  Target Date:6 months - 9/14/2025  Person/Persons responsible for completion of goal: Jeffrey  2.  Short Term Objective (s) - How will we reach this goal?:   A.  Provider new recommended medication/dosage changes and/or continue medication(s): continue current medications as prescribed.  B.  Take psychiatric medications responsibly..  C.  Keep all scheduled appointments..  Target Date:6 months - 9/14/2025  Person/Persons Responsible for Completion of Goal: Jeffrey  Progress Towards Goals: initiating treatment  Treatment Modality: medication management every 1 months  Review due 180 days from date of this plan: 6 months - 9/14/2025  Expected length of service: maintenance unless revised  My Physician/PA/NP and I have developed this plan together and I agree to work on the goals and objectives. I understand the treatment goals that were developed for my treatment.   Electronic Signatures: on file (unless signed below)    DOMO Jamil 03/14/25

## 2025-04-16 ENCOUNTER — TELEPHONE (OUTPATIENT)
Age: 55
End: 2025-04-16

## 2025-04-16 NOTE — TELEPHONE ENCOUNTER
Patient is calling regarding cancelling an appointment.    Date/Time: 4/16/2025 at 8:30 am    Reason: transportation cancelled the last min    Patient was rescheduled: YES [x] NO []  If yes, when was Patient reschedule for: 4/18/2025 at 8 am    Patient requesting call back to reschedule: YES [] NO [x]

## 2025-04-18 ENCOUNTER — OFFICE VISIT (OUTPATIENT)
Dept: PSYCHIATRY | Facility: CLINIC | Age: 55
End: 2025-04-18

## 2025-04-18 DIAGNOSIS — F33.1 MDD (MAJOR DEPRESSIVE DISORDER), RECURRENT EPISODE, MODERATE (HCC): ICD-10-CM

## 2025-04-18 DIAGNOSIS — F41.1 GAD (GENERALIZED ANXIETY DISORDER): Primary | ICD-10-CM

## 2025-04-18 DIAGNOSIS — F43.10 PTSD (POST-TRAUMATIC STRESS DISORDER): ICD-10-CM

## 2025-04-18 PROCEDURE — NOSHOW

## 2025-04-18 NOTE — PSYCH
No Call. No Show. No Charge    Jeffrey Vann no showed 04/18/25 appointment , staff called and left message to reschedule appointment     Treatment Plan not due at this session.

## 2025-07-21 ENCOUNTER — NURSE TRIAGE (OUTPATIENT)
Age: 55
End: 2025-07-21

## 2025-07-21 NOTE — TELEPHONE ENCOUNTER
"REASON FOR CONVERSATION: Back Pain    SYMPTOMS: Right hip pain that radiates into his right shoulder. Has been intermittent since Wednesday. Does not shoot down his leg, it is a a mild throbbing pain. No injury or overuse noted.     OTHER HEALTH INFORMATION: Has had similar pain in past due to sciatic pain. Believes it may be related. Has taking OTC motrin and tylenol and applied icy hot to hip and shoulder.     PROTOCOL DISPOSITION: See Within 3 Days in Office    CARE ADVICE PROVIDED: Advised appointment, able to warm call office and speak to Etta to get him scheduled.  Advised to call us back if symptoms worsen, scheduled for 5pm tomorrow.     PRACTICE FOLLOW-UP: None           Reason for Disposition   MODERATE back pain (e.g., interferes with normal activities) and present > 3 days    Answer Assessment - Initial Assessment Questions  1. ONSET: \"When did the pain begin?\" (e.g., minutes, hours, days)      Wedneday   2. LOCATION: \"Where does it hurt?\" (upper, mid or lower back)      Back   3. SEVERITY: \"How bad is the pain?\"  (e.g., Scale 1-10; mild, moderate, or severe)      Moderate   4. PATTERN: \"Is the pain constant?\" (e.g., yes, no; constant, intermittent)       Comes and goes   5. RADIATION: \"Does the pain shoot into your legs or somewhere else?\"      Shoulder to hip   6. CAUSE:  \"What do you think is causing the back pain?\"       Unsure   7. BACK OVERUSE:  \"Any recent lifting of heavy objects, strenuous work or exercise?\"      No   8. MEDICINES: \"What have you taken so far for the pain?\" (e.g., nothing, acetaminophen, NSAIDS)      Motrin did not help, icy hot , aspercream   9. NEUROLOGIC SYMPTOMS: \"Do you have any weakness, numbness, or problems with bowel/bladder control?\"      No   10. OTHER SYMPTOMS: \"Do you have any other symptoms?\" (e.g., fever, abdomen pain, burning with urination, blood in urine)        No    Protocols used: Back Pain-Adult-[place]    "

## 2025-07-22 ENCOUNTER — APPOINTMENT (OUTPATIENT)
Dept: RADIOLOGY | Facility: MEDICAL CENTER | Age: 55
End: 2025-07-22
Attending: FAMILY MEDICINE
Payer: COMMERCIAL

## 2025-07-22 ENCOUNTER — APPOINTMENT (OUTPATIENT)
Dept: RADIOLOGY | Facility: MEDICAL CENTER | Age: 55
End: 2025-07-22
Payer: COMMERCIAL

## 2025-07-22 ENCOUNTER — OFFICE VISIT (OUTPATIENT)
Dept: FAMILY MEDICINE CLINIC | Facility: CLINIC | Age: 55
End: 2025-07-22
Payer: COMMERCIAL

## 2025-07-22 VITALS
TEMPERATURE: 97.1 F | OXYGEN SATURATION: 96 % | SYSTOLIC BLOOD PRESSURE: 120 MMHG | BODY MASS INDEX: 28.33 KG/M2 | HEIGHT: 72 IN | DIASTOLIC BLOOD PRESSURE: 70 MMHG | WEIGHT: 209.2 LBS | HEART RATE: 83 BPM

## 2025-07-22 DIAGNOSIS — M54.6 ACUTE THORACIC BACK PAIN, UNSPECIFIED BACK PAIN LATERALITY: ICD-10-CM

## 2025-07-22 DIAGNOSIS — M54.6 ACUTE THORACIC BACK PAIN, UNSPECIFIED BACK PAIN LATERALITY: Primary | ICD-10-CM

## 2025-07-22 PROCEDURE — 72072 X-RAY EXAM THORAC SPINE 3VWS: CPT

## 2025-07-22 PROCEDURE — 99213 OFFICE O/P EST LOW 20 MIN: CPT | Performed by: FAMILY MEDICINE

## 2025-07-22 PROCEDURE — 71046 X-RAY EXAM CHEST 2 VIEWS: CPT

## 2025-07-22 RX ORDER — METHYLPREDNISOLONE 4 MG/1
TABLET ORAL
Qty: 21 EACH | Refills: 0 | Status: SHIPPED | OUTPATIENT
Start: 2025-07-22
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
## 2025-07-22 NOTE — LETTER
July 22, 2025    To whom it may concern:    Mr. Jeffrey Vann is an established patient of our practice.  He was in our office for treatment this evening and will receive treatment through our office over the next week.    He will not miss work and will return to work tonight as scheduled.    Respectfully,    eJronimo Brown, DO

## 2025-07-23 ENCOUNTER — TELEPHONE (OUTPATIENT)
Age: 55
End: 2025-07-23

## 2025-07-23 NOTE — TELEPHONE ENCOUNTER
Patient said went for xray and was told he needed XR entire spine (scoliosis) 4-5 vw (Order [id number])   To be re-written as entire spine scoliosis 2-3 VW  Please advise if a new referral can be placed   Thank you

## 2025-07-24 DIAGNOSIS — M41.25 OTHER IDIOPATHIC SCOLIOSIS, THORACOLUMBAR REGION: Primary | ICD-10-CM

## 2025-07-30 DIAGNOSIS — M51.34 DISCOGENIC THORACIC PAIN: Primary | ICD-10-CM

## 2025-08-07 ENCOUNTER — EVALUATION (OUTPATIENT)
Dept: PHYSICAL THERAPY | Facility: HOME HEALTHCARE | Age: 55
End: 2025-08-07
Attending: FAMILY MEDICINE
Payer: COMMERCIAL

## 2025-08-07 DIAGNOSIS — M51.34 DISCOGENIC THORACIC PAIN: Primary | ICD-10-CM

## 2025-08-07 PROCEDURE — 97161 PT EVAL LOW COMPLEX 20 MIN: CPT

## 2025-08-07 PROCEDURE — 97140 MANUAL THERAPY 1/> REGIONS: CPT

## 2025-08-07 PROCEDURE — 97110 THERAPEUTIC EXERCISES: CPT

## 2025-08-12 ENCOUNTER — OFFICE VISIT (OUTPATIENT)
Dept: PHYSICAL THERAPY | Facility: HOME HEALTHCARE | Age: 55
End: 2025-08-12
Attending: FAMILY MEDICINE
Payer: COMMERCIAL

## 2025-08-14 ENCOUNTER — OFFICE VISIT (OUTPATIENT)
Dept: PHYSICAL THERAPY | Facility: HOME HEALTHCARE | Age: 55
End: 2025-08-14
Attending: FAMILY MEDICINE
Payer: COMMERCIAL

## 2025-08-18 ENCOUNTER — TELEPHONE (OUTPATIENT)
Dept: FAMILY MEDICINE CLINIC | Facility: CLINIC | Age: 55
End: 2025-08-18

## 2025-08-19 ENCOUNTER — OFFICE VISIT (OUTPATIENT)
Dept: PHYSICAL THERAPY | Facility: HOME HEALTHCARE | Age: 55
End: 2025-08-19
Attending: FAMILY MEDICINE
Payer: COMMERCIAL

## 2025-08-19 DIAGNOSIS — M51.34 DISCOGENIC THORACIC PAIN: Primary | ICD-10-CM

## 2025-08-19 PROCEDURE — 97112 NEUROMUSCULAR REEDUCATION: CPT | Performed by: PHYSICAL THERAPIST

## 2025-08-19 PROCEDURE — 97140 MANUAL THERAPY 1/> REGIONS: CPT | Performed by: PHYSICAL THERAPIST

## 2025-08-21 ENCOUNTER — OFFICE VISIT (OUTPATIENT)
Dept: PHYSICAL THERAPY | Facility: HOME HEALTHCARE | Age: 55
End: 2025-08-21
Attending: FAMILY MEDICINE
Payer: COMMERCIAL

## 2025-08-21 DIAGNOSIS — M51.34 DISCOGENIC THORACIC PAIN: Primary | ICD-10-CM

## 2025-08-21 PROCEDURE — 97140 MANUAL THERAPY 1/> REGIONS: CPT

## 2025-08-21 PROCEDURE — 97112 NEUROMUSCULAR REEDUCATION: CPT

## (undated) DEVICE — 3M™ TEGADERM™ TRANSPARENT FILM DRESSING FRAME STYLE, 1624W, 2-3/8 IN X 2-3/4 IN (6 CM X 7 CM), 100/CT 4CT/CASE: Brand: 3M™ TEGADERM™

## (undated) DEVICE — PAD GROUNDING DUAL ADULT

## (undated) DEVICE — TUBING SUCTION 5MM X 12 FT

## (undated) DEVICE — GLOVE INDICATOR PI UNDERGLOVE SZ 8 BLUE

## (undated) DEVICE — NEPTUNE E-SEP SMOKE EVACUATION PENCIL, COATED, 70MM BLADE, PUSH BUTTON SWITCH: Brand: NEPTUNE E-SEP

## (undated) DEVICE — EXOFIN PRECISION PEN HIGH VISCOSITY TOPICAL SKIN ADHESIVE: Brand: EXOFIN PRECISION PEN, 1G

## (undated) DEVICE — GLOVE PI ULTRA TOUCH SZ.7.0

## (undated) DEVICE — MEDI-VAC YANK SUCT HNDL W/TPRD BULBOUS TIP: Brand: CARDINAL HEALTH

## (undated) DEVICE — SYRINGE 10ML LL

## (undated) DEVICE — SUT ETHILON 5-0 P-3 18 IN 698H

## (undated) DEVICE — GLOVE PI ULTRA TOUCH SZ.6.5

## (undated) DEVICE — DRAPE SHEET THREE QUARTER

## (undated) DEVICE — NEEDLE 25G X 1 1/2

## (undated) DEVICE — Device

## (undated) DEVICE — DRAPE TOWEL: Brand: CONVERTORS

## (undated) DEVICE — 3M™ STERI-STRIP™ REINFORCED ADHESIVE SKIN CLOSURES, R1546, 1/4 IN X 4 IN (6 MM X 100 MM), 10 STRIPS/ENVELOPE: Brand: 3M™ STERI-STRIP™

## (undated) DEVICE — GLOVE INDICATOR PI UNDERGLOVE SZ 7 BLUE

## (undated) DEVICE — BETHLEHEM UNIVERSAL MINOR GEN: Brand: CARDINAL HEALTH

## (undated) DEVICE — SUT VICRYL 3-0 SH 27 IN J416H

## (undated) DEVICE — GAUZE SPONGES,8 PLY: Brand: CURITY

## (undated) DEVICE — SUT PROLENE 6-0 P-3 18 IN 8695G

## (undated) DEVICE — SUT MONOCRYL 4-0 PS-2 27 IN Y426H

## (undated) DEVICE — WET SKIN PREP TRAY: Brand: MEDLINE INDUSTRIES, INC.

## (undated) DEVICE — ANTIBACTERIAL UNDYED BRAIDED (POLYGLACTIN 910), SYNTHETIC ABSORBABLE SUTURE: Brand: COATED VICRYL

## (undated) DEVICE — DRAPE ADOLESCENT LAPAROTOMY